# Patient Record
Sex: FEMALE | Race: BLACK OR AFRICAN AMERICAN | NOT HISPANIC OR LATINO | URBAN - METROPOLITAN AREA
[De-identification: names, ages, dates, MRNs, and addresses within clinical notes are randomized per-mention and may not be internally consistent; named-entity substitution may affect disease eponyms.]

---

## 2019-05-09 ENCOUNTER — EMERGENCY (EMERGENCY)
Facility: HOSPITAL | Age: 44
LOS: 1 days | Discharge: HOME | End: 2019-05-09
Attending: EMERGENCY MEDICINE | Admitting: EMERGENCY MEDICINE
Payer: MEDICAID

## 2019-05-09 VITALS
TEMPERATURE: 98 F | OXYGEN SATURATION: 98 % | SYSTOLIC BLOOD PRESSURE: 135 MMHG | RESPIRATION RATE: 18 BRPM | HEART RATE: 96 BPM | DIASTOLIC BLOOD PRESSURE: 93 MMHG

## 2019-05-09 PROCEDURE — 99284 EMERGENCY DEPT VISIT MOD MDM: CPT

## 2019-05-09 RX ORDER — HYDROXYZINE HCL 10 MG
25 TABLET ORAL ONCE
Refills: 0 | Status: COMPLETED | OUTPATIENT
Start: 2019-05-09 | End: 2019-05-09

## 2019-05-09 RX ORDER — CETIRIZINE HYDROCHLORIDE 10 MG/1
1 TABLET ORAL
Qty: 30 | Refills: 0
Start: 2019-05-09 | End: 2019-05-23

## 2019-05-09 RX ORDER — ALBUTEROL 90 UG/1
2 AEROSOL, METERED ORAL
Qty: 18 | Refills: 2
Start: 2019-05-09 | End: 2019-08-06

## 2019-05-09 RX ORDER — IPRATROPIUM/ALBUTEROL SULFATE 18-103MCG
3 AEROSOL WITH ADAPTER (GRAM) INHALATION
Refills: 0 | Status: COMPLETED | OUTPATIENT
Start: 2019-05-09 | End: 2019-05-09

## 2019-05-09 RX ADMIN — Medication 3 MILLILITER(S): at 17:56

## 2019-05-09 RX ADMIN — Medication 25 MILLIGRAM(S): at 17:17

## 2019-05-09 RX ADMIN — Medication 3 MILLILITER(S): at 17:55

## 2019-05-09 RX ADMIN — Medication 3 MILLILITER(S): at 17:17

## 2019-05-09 RX ADMIN — Medication 60 MILLIGRAM(S): at 17:17

## 2019-05-09 NOTE — ED ADULT TRIAGE NOTE - CHIEF COMPLAINT QUOTE
Pt c/o sob starting yesterday, hx of asthma given 2 duoneb treatments with minimal relief, pt breathing even and mildly labored at present, denies cp/discomfort, denies headache/dizziness.

## 2019-05-09 NOTE — ED ADULT NURSE NOTE - OBJECTIVE STATEMENT
Pt AxOx4 ambulatory at baseline presenting to intake with c/o SOB x1 day. PMH of Asthma. Pt states she is allergic to cats, was near cats last night and thinks it triggered her SOB. Pt took treatments at home but found no relief. On arrival to ED pt is slightly SOB, but satting well on RA. Pt denies CP, lightheadedness, dizziness, N/V. Pt to be medicated in intake with DuoNebs and Prednisone as per MD orders. MD at bedside, will continue to monitor.

## 2019-05-09 NOTE — ED PROVIDER NOTE - CARE PLAN
Principal Discharge DX:	Moderate persistent reactive airway disease with acute exacerbation  Secondary Diagnosis:	Dander (animal) allergy

## 2019-05-09 NOTE — ED PROVIDER NOTE - PMH
Anemia    Asthma  last attack was one year ago; Never intubated or hospitalized  Gestational diabetes    Umbilical hernia

## 2019-05-13 ENCOUNTER — EMERGENCY (EMERGENCY)
Facility: HOSPITAL | Age: 44
LOS: 1 days | Discharge: HOME | End: 2019-05-13
Attending: EMERGENCY MEDICINE | Admitting: EMERGENCY MEDICINE
Payer: MEDICAID

## 2019-05-13 VITALS
TEMPERATURE: 98 F | DIASTOLIC BLOOD PRESSURE: 77 MMHG | OXYGEN SATURATION: 95 % | SYSTOLIC BLOOD PRESSURE: 119 MMHG | HEART RATE: 93 BPM | RESPIRATION RATE: 19 BRPM

## 2019-05-13 VITALS
OXYGEN SATURATION: 100 % | DIASTOLIC BLOOD PRESSURE: 80 MMHG | RESPIRATION RATE: 18 BRPM | HEART RATE: 89 BPM | TEMPERATURE: 98 F | SYSTOLIC BLOOD PRESSURE: 138 MMHG

## 2019-05-13 PROCEDURE — 99284 EMERGENCY DEPT VISIT MOD MDM: CPT | Mod: 25

## 2019-05-13 RX ORDER — DIPHENHYDRAMINE HCL 50 MG
50 CAPSULE ORAL ONCE
Refills: 0 | Status: COMPLETED | OUTPATIENT
Start: 2019-05-13 | End: 2019-05-13

## 2019-05-13 RX ORDER — IPRATROPIUM/ALBUTEROL SULFATE 18-103MCG
3 AEROSOL WITH ADAPTER (GRAM) INHALATION ONCE
Refills: 0 | Status: COMPLETED | OUTPATIENT
Start: 2019-05-13 | End: 2019-05-13

## 2019-05-13 RX ADMIN — Medication 50 MILLIGRAM(S): at 03:20

## 2019-05-13 RX ADMIN — Medication 3 MILLILITER(S): at 02:50

## 2019-05-13 RX ADMIN — Medication 3 MILLILITER(S): at 02:35

## 2019-05-13 NOTE — ED PROVIDER NOTE - OBJECTIVE STATEMENT
43yoF hx of seasonal asthma pw mild sob after smoking a cigarette tonight, worse with the smoking, not improved with home albuterol, better on arrival to ED. no fevers, chills, n/v, dizziness, weakness, numbness, cough, chest pain, belly pain, paresthesias, fecal/urinary incontinence.

## 2019-05-13 NOTE — ED ADULT NURSE NOTE - OBJECTIVE STATEMENT
Pt received in rm 15, 43Y Female, AOx4, ambulatory, c/o sob. Pt reports was seen here yesterday for similar symptoms and dc on steroids. Pt reports had argument with daughter and had a cigarette and then felt sob. Pt breathing even and unlabored, O2 sat 100% on RA. Pt appears in NAD at this time, vitals as charted, pt given x1 duoneb as ordered, will continue to monitor.

## 2019-05-13 NOTE — ED ADULT TRIAGE NOTE - CHIEF COMPLAINT QUOTE
Patient c/o shortness of breath x1 hour and "burning to the chest". Patient reports taking prednisone and albuteral pump with no relief. Patient denies any hx. intubation. Hx. asthma.

## 2019-05-13 NOTE — ED PROVIDER NOTE - CLINICAL SUMMARY MEDICAL DECISION MAKING FREE TEXT BOX
female hx of asthma pw sob in setting of smoking cigarette, improved with duonebs. will monitor and dc home with continuation of steroids. female hx of asthma pw sob in setting of smoking cigarette, improved with Duoneb. likely asthma however will rule out pna, pnx with cxr and dc if clinically improved.

## 2019-05-13 NOTE — ED PROVIDER NOTE - PROGRESS NOTE DETAILS
rayna YOUNG - sob improved after duoneb. patient taking steroids currently. understands that smoking can aggravate asthma. will dc home and counseled patient on quitting smoking, and continuing steroids. patient agrees and understands. will dc home. Geoffrey YOUNG - patient refused xray. understands we could miss a popped lung or pna. will dc home.

## 2019-05-13 NOTE — ED PROVIDER NOTE - ATTENDING CONTRIBUTION TO CARE
Pt with recent asthma exacerbation seen here a few days ago with nebs and steroids.  Was doing better until tonight when she smoked a cigarette and started to have trouble breathing again.  Never intubated and no ICU stays    Gen: Well appearing in NAD  Head: NC/AT  Neck: trachea midline  Resp:  No distress CTAB  Ext: no deformities  Neuro:  A&O appears non focal  Skin:  Warm and dry as visualized  Psych:  Normal affect and mood     Pt with SOB and wheezing after cigarette use in the setting of a known exacerbation.  Will treat with nebs as well as add an xray to ensure not missing any other pathology.  Will be able to be dc with continued steroid course as pt appears and sounds clear in ED Pt with recent asthma exacerbation seen here a few days ago with nebs and steroids.  Was doing better until tonight when she smoked a cigarette and started to have trouble breathing again.  Never intubated and no ICU stays    Gen: Well appearing in NAD  Head: NC/AT  Neck: trachea midline  Resp:  No distress CTAB  Ext: no deformities  Neuro:  A&O appears non focal  Skin:  Warm and dry as visualized  Psych:  Normal affect and mood     Pt with SOB and wheezing after cigarette use in the setting of a known exacerbation.  Will treat with nebs as well as add an xray to ensure not missing any other pathology.  Will be able to be dc with continued steroid course as pt appears and sounds clear in ED.

## 2019-05-13 NOTE — ED PROVIDER NOTE - NSFOLLOWUPINSTRUCTIONS_ED_ALL_ED_FT
1) Follow up with your doctor in 1 week. Call for an appointment.   2) Return to the ER immediately for new or worsening symptoms including uncontrollable chest pain, vomiting, sob or other issues.   3) Take albuterol inhaler every 4-6 hours as needed for shortness of breath.  4) continue to take the steroids as prescribed.

## 2019-05-13 NOTE — ED PROVIDER NOTE - CARE PLAN
Principal Discharge DX:	Asthma exacerbation, mild Principal Discharge DX:	Asthma exacerbation, mild  Secondary Diagnosis:	Shortness of breath

## 2019-06-21 ENCOUNTER — EMERGENCY (EMERGENCY)
Facility: HOSPITAL | Age: 44
LOS: 1 days | Discharge: HOME | End: 2019-06-21
Attending: EMERGENCY MEDICINE | Admitting: EMERGENCY MEDICINE
Payer: MEDICAID

## 2019-06-21 VITALS
HEART RATE: 82 BPM | RESPIRATION RATE: 14 BRPM | DIASTOLIC BLOOD PRESSURE: 75 MMHG | TEMPERATURE: 98 F | SYSTOLIC BLOOD PRESSURE: 111 MMHG | OXYGEN SATURATION: 96 %

## 2019-06-21 VITALS
DIASTOLIC BLOOD PRESSURE: 76 MMHG | RESPIRATION RATE: 16 BRPM | HEART RATE: 87 BPM | OXYGEN SATURATION: 95 % | TEMPERATURE: 99 F | SYSTOLIC BLOOD PRESSURE: 118 MMHG

## 2019-06-21 PROCEDURE — 93010 ELECTROCARDIOGRAM REPORT: CPT

## 2019-06-21 PROCEDURE — 99284 EMERGENCY DEPT VISIT MOD MDM: CPT | Mod: 25

## 2019-06-21 RX ORDER — IPRATROPIUM/ALBUTEROL SULFATE 18-103MCG
3 AEROSOL WITH ADAPTER (GRAM) INHALATION ONCE
Refills: 0 | Status: COMPLETED | OUTPATIENT
Start: 2019-06-21 | End: 2019-06-21

## 2019-06-21 RX ORDER — DEXAMETHASONE 0.5 MG/5ML
10 ELIXIR ORAL ONCE
Refills: 0 | Status: DISCONTINUED | OUTPATIENT
Start: 2019-06-21 | End: 2019-06-21

## 2019-06-21 RX ADMIN — Medication 3 MILLILITER(S): at 07:29

## 2019-06-21 RX ADMIN — Medication 60 MILLIGRAM(S): at 07:30

## 2019-06-21 RX ADMIN — Medication 3 MILLILITER(S): at 07:30

## 2019-06-21 NOTE — ED PROVIDER NOTE - OBJECTIVE STATEMENT
42yo woman PMH asthma presents with SOB x 2 days. Pt reports that she felt tightness when breathing yesterday and used her albuterol pump twice yesterday with some improvement but continued to have SOB this morning and tried her albuterol pump as well as nebulizer with minimal improvement and called EMS. EMS gave her 1 nebulizer treatment which improved her symptoms. She also notices that she's been having more cough in the past few days with clear sputum. No runny nose or congestion, no ear pain, no eye redness, no fever. She denies chest pain, n/v, lightheadedness, no LE swelling or pain, no recent travels. 42yo woman PMH asthma presents with SOB x 2 days. Pt reports that she felt tightness when breathing yesterday and used her albuterol pump twice yesterday with some improvement but continued to have SOB this morning and tried her albuterol pump as well as nebulizer with minimal improvement and called EMS. EMS gave her 1 nebulizer treatment which improved her symptoms. She also notices that she's been having more cough in the past few days with clear sputum. No runny nose or congestion, no ear pain, no eye redness, no fever. She denies chest pain, n/v, lightheadedness, no LE swelling or pain, no recent travels, no recent hospitalization or surgeries, no history of cancer, no birth control medications, no history of clots. She has no history of hospitalizations or intubations due to asthma

## 2019-06-21 NOTE — ED PROVIDER NOTE - NS ED ROS FT
General: no fever  Head: no headache or lightheadedness  Eyes: no vision changes or blurry vision, no eye redness  ENT: no ear pain, no nasal discharge, no neck pain  CV: no chest pain, no palpitations  Resp: +SOB, +cough  GI: no N/V, no abdominal pain  : no dysuria  MSK: no joint pain  Skin: no new rash  Neuro: no focal weakness, no change in sensation

## 2019-06-21 NOTE — ED PROVIDER NOTE - NSFOLLOWUPINSTRUCTIONS_ED_ALL_ED_FT
Please follow up with your primary care provider within the next 1-2 days.    Take prednisone as prescribed for 4 additional days. Use albuterol 2 puffs every 4 hours for 48 hours. Afterward, use albuterol 2 puffs every 4 hours as needed for wheezing or shortness of breath. Use a spacer as directed.    Get help right away if:  Your peak flow is less than 50% of your personal best (red zone).  You are getting worse and do not respond to treatment during an asthma flare.  You are short of breath at rest or when doing very little physical activity.  You have difficulty eating, drinking, or talking.  You have chest pain.  Your lips or fingernails look bluish.  You are light-headed or dizzy, or you faint.    Please read all attached.

## 2019-06-21 NOTE — ED PROVIDER NOTE - PROGRESS NOTE DETAILS
Sola Unger, resident MD: pt reports improvement of symptoms. clear lungs on exam, will give instructions for prednisone x 4 days and albuterol use with spacer. will discharge patient home at this time. discussed return precautions and need for outpatient follow up.

## 2019-06-21 NOTE — ED ADULT NURSE NOTE - NSIMPLEMENTINTERV_GEN_ALL_ED
Implemented All Universal Safety Interventions:  [place] to call system. Call bell, personal items and telephone within reach. Instruct patient to call for assistance. Room bathroom lighting operational. Non-slip footwear when patient is off stretcher. Physically safe environment: no spills, clutter or unnecessary equipment. Stretcher in lowest position, wheels locked, appropriate side rails in place.

## 2019-06-21 NOTE — ED ADULT NURSE NOTE - OBJECTIVE STATEMENT
Pt is a 43 year old female reporting to the ED for asthma exacerbation. Pt reports waking up at 4 am from sleep, coughing and SOB. Pt reports she did an albuterol inhaler at home with no relief. Pt called 911, received 1 tx of combivent and reports "feeling better". Pt reports decrease sob and decreased cough. Pt is AOx4. Pt denies chest pain. Pt reports SOB, mild. Pt rr even and unlabored. Pt speaking in full sentences, no drooling or stridor noted. Pt denies fever, chills, n/v/. Pt not coughing at this time. Pt received medication as ordered, will continue to monitor.

## 2019-06-21 NOTE — ED ADULT TRIAGE NOTE - CHIEF COMPLAINT QUOTE
Pt ambulatory to triage via [facility] pt st" I have asthma coughing since yesterday morning using inhaler intially help but now this morning not helping feel worse." Pt talking in complete sentences As per EMT" Gave combivent tx x1"

## 2019-06-21 NOTE — ED PROVIDER NOTE - PHYSICAL EXAMINATION
General: well-appearing woman in no acute respiratory distress, speaking in full sentences  Head: normocephalic, atraumatic  Eyes: clear eyes with no redness or discharge  Mouth: moist mucous membranes  Neck: supple neck  CV: normal rate and rhythm, normal S1 and S2  Respiratory: expiratory wheezing at bases  Abdomen: soft, nontender, nondistended  Neuro: alert and oriented x3, speech clear, moving all extremities spontaneously  Extremities: no LE edema or tenderness to palpation, peripheral pulses 2+ bilaterally

## 2019-06-21 NOTE — ED PROVIDER NOTE - ATTENDING CONTRIBUTION TO CARE
Dr. Burleson: 44 yo female with asthma (never hospitalized or intubated for asthma) in ED with 2 days of SOB and cough that feels similar to prior asthma exacerbations.  Pt used albuterol and nebulizer at home with minimal improvement and so called EMS.  EMS gave pt nebulizer treatment and pt felt improvement before arriving in ED.  She denies CP, fever, N/V/D or abdominal pain.  I evaluated pt after she had already received nebulizer treatment and reported improvement in symptoms.  On exam pt well appearing, in NAD, heart RRR, lungs CTAB, abd NTND, extremities without swelling, strength 5/5 in all extremities and skin without rash.

## 2019-06-21 NOTE — ED PROVIDER NOTE - CLINICAL SUMMARY MEDICAL DECISION MAKING FREE TEXT BOX
42yo woman PMH asthma presents with SOB x 2 days with cough and expiratory wheezing bilaterally on exam. Will check ecg to r/o cardiac etiology, but pt has no chest pain. Likely asthma exacerbation with improvement with duonebs with ems, will give duonebs and start on steroids. discussed xray with pt to r/o pna vs pneumo and r/b discussed but pt refuses xray at this time. bilateral breath sounds and no fevers so low suspicion. Will reassess after treatment and continue to monitor. 42yo woman PMH asthma presents with SOB x 2 days with cough and expiratory wheezing bilaterally on exam. Will check ecg to r/o cardiac etiology, but pt has no chest pain. Likely asthma exacerbation with improvement with duonebs with ems, will give duonebs and start on steroids. discussed xray with pt to r/o pna vs pneumo and r/b discussed but pt refuses xray at this time. bilateral breath sounds and no fevers so low suspicion. pt is perc negative. Will reassess after treatment and continue to monitor.

## 2019-10-20 ENCOUNTER — EMERGENCY (EMERGENCY)
Facility: HOSPITAL | Age: 44
LOS: 1 days | Discharge: HOME | End: 2019-10-20
Attending: HOSPITALIST | Admitting: HOSPITALIST
Payer: MEDICAID

## 2019-10-20 VITALS
DIASTOLIC BLOOD PRESSURE: 81 MMHG | HEART RATE: 63 BPM | OXYGEN SATURATION: 99 % | SYSTOLIC BLOOD PRESSURE: 113 MMHG | RESPIRATION RATE: 18 BRPM | TEMPERATURE: 98 F

## 2019-10-20 VITALS
TEMPERATURE: 98 F | DIASTOLIC BLOOD PRESSURE: 102 MMHG | RESPIRATION RATE: 20 BRPM | SYSTOLIC BLOOD PRESSURE: 130 MMHG | OXYGEN SATURATION: 99 % | HEART RATE: 90 BPM

## 2019-10-20 LAB
ALBUMIN SERPL ELPH-MCNC: 4 G/DL — SIGNIFICANT CHANGE UP (ref 3.3–5)
ALP SERPL-CCNC: 52 U/L — SIGNIFICANT CHANGE UP (ref 40–120)
ALT FLD-CCNC: 10 U/L — SIGNIFICANT CHANGE UP (ref 4–33)
ANION GAP SERPL CALC-SCNC: 11 MMO/L — SIGNIFICANT CHANGE UP (ref 7–14)
AST SERPL-CCNC: 11 U/L — SIGNIFICANT CHANGE UP (ref 4–32)
BASOPHILS # BLD AUTO: 0.03 K/UL — SIGNIFICANT CHANGE UP (ref 0–0.2)
BASOPHILS NFR BLD AUTO: 0.3 % — SIGNIFICANT CHANGE UP (ref 0–2)
BILIRUB SERPL-MCNC: < 0.2 MG/DL — LOW (ref 0.2–1.2)
BUN SERPL-MCNC: 11 MG/DL — SIGNIFICANT CHANGE UP (ref 7–23)
CALCIUM SERPL-MCNC: 9.1 MG/DL — SIGNIFICANT CHANGE UP (ref 8.4–10.5)
CHLORIDE SERPL-SCNC: 105 MMOL/L — SIGNIFICANT CHANGE UP (ref 98–107)
CO2 SERPL-SCNC: 22 MMOL/L — SIGNIFICANT CHANGE UP (ref 22–31)
CREAT SERPL-MCNC: 0.66 MG/DL — SIGNIFICANT CHANGE UP (ref 0.5–1.3)
EOSINOPHIL # BLD AUTO: 0.38 K/UL — SIGNIFICANT CHANGE UP (ref 0–0.5)
EOSINOPHIL NFR BLD AUTO: 4.4 % — SIGNIFICANT CHANGE UP (ref 0–6)
GLUCOSE SERPL-MCNC: 80 MG/DL — SIGNIFICANT CHANGE UP (ref 70–99)
HCT VFR BLD CALC: 35.3 % — SIGNIFICANT CHANGE UP (ref 34.5–45)
HGB BLD-MCNC: 11.1 G/DL — LOW (ref 11.5–15.5)
HIV COMBO RESULT: SIGNIFICANT CHANGE UP
HIV1+2 AB SPEC QL: SIGNIFICANT CHANGE UP
IMM GRANULOCYTES NFR BLD AUTO: 0.2 % — SIGNIFICANT CHANGE UP (ref 0–1.5)
LYMPHOCYTES # BLD AUTO: 3.2 K/UL — SIGNIFICANT CHANGE UP (ref 1–3.3)
LYMPHOCYTES # BLD AUTO: 36.7 % — SIGNIFICANT CHANGE UP (ref 13–44)
MCHC RBC-ENTMCNC: 29.4 PG — SIGNIFICANT CHANGE UP (ref 27–34)
MCHC RBC-ENTMCNC: 31.4 % — LOW (ref 32–36)
MCV RBC AUTO: 93.6 FL — SIGNIFICANT CHANGE UP (ref 80–100)
MONOCYTES # BLD AUTO: 0.93 K/UL — HIGH (ref 0–0.9)
MONOCYTES NFR BLD AUTO: 10.7 % — SIGNIFICANT CHANGE UP (ref 2–14)
NEUTROPHILS # BLD AUTO: 4.16 K/UL — SIGNIFICANT CHANGE UP (ref 1.8–7.4)
NEUTROPHILS NFR BLD AUTO: 47.7 % — SIGNIFICANT CHANGE UP (ref 43–77)
NRBC # FLD: 0 K/UL — SIGNIFICANT CHANGE UP (ref 0–0)
PLATELET # BLD AUTO: 332 K/UL — SIGNIFICANT CHANGE UP (ref 150–400)
PMV BLD: 9.9 FL — SIGNIFICANT CHANGE UP (ref 7–13)
POTASSIUM SERPL-MCNC: 3.9 MMOL/L — SIGNIFICANT CHANGE UP (ref 3.5–5.3)
POTASSIUM SERPL-SCNC: 3.9 MMOL/L — SIGNIFICANT CHANGE UP (ref 3.5–5.3)
PROT SERPL-MCNC: 7 G/DL — SIGNIFICANT CHANGE UP (ref 6–8.3)
RBC # BLD: 3.77 M/UL — LOW (ref 3.8–5.2)
RBC # FLD: 13 % — SIGNIFICANT CHANGE UP (ref 10.3–14.5)
SODIUM SERPL-SCNC: 138 MMOL/L — SIGNIFICANT CHANGE UP (ref 135–145)
WBC # BLD: 8.72 K/UL — SIGNIFICANT CHANGE UP (ref 3.8–10.5)
WBC # FLD AUTO: 8.72 K/UL — SIGNIFICANT CHANGE UP (ref 3.8–10.5)

## 2019-10-20 PROCEDURE — 99284 EMERGENCY DEPT VISIT MOD MDM: CPT

## 2019-10-20 PROCEDURE — 71046 X-RAY EXAM CHEST 2 VIEWS: CPT | Mod: 26

## 2019-10-20 RX ORDER — IPRATROPIUM/ALBUTEROL SULFATE 18-103MCG
3 AEROSOL WITH ADAPTER (GRAM) INHALATION ONCE
Refills: 0 | Status: COMPLETED | OUTPATIENT
Start: 2019-10-20 | End: 2019-10-20

## 2019-10-20 RX ADMIN — Medication 3 MILLILITER(S): at 01:43

## 2019-10-20 RX ADMIN — Medication 3 MILLILITER(S): at 01:41

## 2019-10-20 NOTE — ED ADULT TRIAGE NOTE - CHIEF COMPLAINT QUOTE
Pt walk in. c/o Shortness of Breath worsening today, taking Symbicort/Montelukast. PMHx Asthma. Never Intubated. Dx with Pneumonia 2 weeks ago. Finished ABx. claimed do not feels better. Also c/o Breaking out of Pimples.   Denies Dizziness Ha CP palpitation N V cough Fever No sick contact

## 2019-10-20 NOTE — ED PROVIDER NOTE - PATIENT PORTAL LINK FT
You can access the FollowMyHealth Patient Portal offered by [facility] by registering at the following website: [email or web address]. By joining [employer]’s FollowMyHealth portal, you will also be able to view your health information using other applications (apps) compatible with our system.

## 2019-10-20 NOTE — ED ADULT NURSE NOTE - NSIMPLEMENTINTERV_GEN_ALL_ED
Implemented All Universal Safety Interventions:  [place] to call system. Call bell, personal items and telephone within reach. Instruct patient to call for assistance. Room bathroom lighting operational. Non-slip footwear when patient is off stretcher. Physically safe environment: no spills, clutter or unnecessary equipment. Stretcher in lowest position, wheels locked, appropriate side rails in place.

## 2019-10-20 NOTE — ED PROVIDER NOTE - ATTENDING CONTRIBUTION TO CARE
44F with hx of asthma and tobacco use p/w sob. patient states 2 weeks ago she was hospitalized for COPD exacerbation and pneumonia. feeling better after discharge from hospital but today developed recurrent sob. no cough or fever, has been using prescribed inhalers w/o improvement.   ***GEN - NAD; well appearing; A+O x3 ***HEAD - NC/AT ***EYES/NOSE - PEERL, EOMI, mucous membranes moist, no discharge ***THROAT: Oral cavity and pharynx normal. No inflammation, swelling, exudate, or lesions.  ***NECK: Neck supple, non-tender without lymphadenopathy, no masses, no thyromegaly.   ***PULMONARY - CTA b/l, symmetric breath sounds. ***CARDIAC -s1s2, RRR, no M,G,R  ***ABDOMEN - +BS, ND, NT, soft, no guarding, no rebound, no masses   ***BACK - no CVA tenderness, Normal  spine ***EXTREMITIES - symmetric pulses, 2+ dp, capillary refill < 2 seconds, no clubbing, no cyanosis, no edema ***SKIN - no rash or bruising   ***NEUROLOGIC - alert, CN 2-12 intact, reflexes nl, sensation nl, coordination nl, finger to nose nl, romberg negative, motor 5/5 RUE/LUE/RLE/LLE/EHL/Plantar flexion, no pronator drift, gait nl, ***PSYCH - insight and judgment nl, memory nl, affect nl, thought nl  MDM: 44F with asthma, recent PNA p/w recurrent sob x1 day. no fever or wheezing on exam. well appearing. labs, cxr, ekg, nebs, reassess.

## 2019-10-20 NOTE — ED PROVIDER NOTE - OBJECTIVE STATEMENT
44f w hx asthma here for multiple complaints. Pt states was admitted to a hospital in [place] for SOB when was dx w pna and discharged 2 wks ago w total 5day course abx. From there was started on symbicort and singulair; was never previously on daily asthma meds and never admitted/intubated. C/o SOB that has been present since but worsened today. Denies cp or cough. Does not feel wheeze. Also c/o comedonal rash to face that is chronic. No recent travel, no leg pain/swelling, no hx VTE, not on hormonal tx.

## 2019-10-20 NOTE — ED PROVIDER NOTE - CLINICAL SUMMARY MEDICAL DECISION MAKING FREE TEXT BOX
44f w asthma here for acute on chronic SOB. Appears well w normal VS. Lungs clear. Will treat sx w nebs though unlikely asthma exac given exam. Also check basic labs and cxr given recent pna, reassess

## 2019-10-20 NOTE — ED PROVIDER NOTE - NSFOLLOWUPCLINICS_GEN_ALL_ED_FT
[facility] General Internal Medicine  General Internal Medicine  [address] [place] [address]  Phone: [phone]  Fax:     [facility] Pulmonolgy and Sleep Medicine  Pulmonology  [address] [place] [address]  Phone: [phone]  Fax:   Follow Up Time:

## 2019-10-20 NOTE — ED ADULT NURSE NOTE - OBJECTIVE STATEMENT
45y/o female aaox4 and ambulatory c/o SOB. Pt states she has been SOB x7 months and it has been worsening. Pt states a recent admission to the hospital d/t SOB and "I almost ." Pt states she has been experiencing intermittent chest pains with inspiration. Pt denies cough, back pain, palpitations, diaphoresis, N/V/D. Vital signs as noted. 20g in RAC; labs collected and sent as per MD order. Pt medicated as per MD order. Will continue to monitor.

## 2019-10-20 NOTE — ED PROVIDER NOTE - NSFOLLOWUPINSTRUCTIONS_ED_ALL_ED_FT
You were seen in the emergency department for shortness of breath. Please follow up with your primary doctor and a pulmonologist. Continue taking your medications as prescribed. Return to the emergency department immediately if you experience difficulty breathing, fever, chest pain or any other concerning symptoms.

## 2019-10-20 NOTE — ED PROVIDER NOTE - PROGRESS NOTE DETAILS
Elizabeth Goldberger PGY-3: CXR neg. Pt feeling better. Lung sounds clear. Pt already w symbicort at home, will dc w outpt fu

## 2019-11-23 ENCOUNTER — HOSPITAL ENCOUNTER (EMERGENCY)
Facility: HOSPITAL | Age: 44
Discharge: HOME | End: 2019-11-23
Attending: EMERGENCY MEDICINE | Admitting: EMERGENCY MEDICINE
Payer: MEDICAID

## 2019-11-23 VITALS
WEIGHT: 180 LBS | OXYGEN SATURATION: 99 % | RESPIRATION RATE: 18 BRPM | SYSTOLIC BLOOD PRESSURE: 119 MMHG | TEMPERATURE: 98.9 F | HEIGHT: 62 IN | BODY MASS INDEX: 33.13 KG/M2 | HEART RATE: 72 BPM | DIASTOLIC BLOOD PRESSURE: 55 MMHG

## 2019-11-23 DIAGNOSIS — B37.3 VULVOVAGINAL CANDIDIASIS: Primary | ICD-10-CM

## 2019-11-23 LAB
BACTERIA UR QL AUTO: ABNORMAL /HPF
BILIRUB UR QL STRIP: NEGATIVE
CLARITY UR: ABNORMAL
COLOR UR: YELLOW
COLOR, POC: ABNORMAL
EXT PREG TEST URINE: NEGATIVE
EXT. CONTROL ED NAV: NORMAL
GLUCOSE UR STRIP-MCNC: NEGATIVE MG/DL
HGB UR QL STRIP.AUTO: ABNORMAL
KETONES UR STRIP-MCNC: NEGATIVE MG/DL
LEUKOCYTE ESTERASE UR QL STRIP: ABNORMAL
NITRITE UR QL STRIP: NEGATIVE
NON-SQ EPI CELLS URNS QL MICRO: ABNORMAL /HPF
PH UR STRIP.AUTO: 5 [PH] (ref 4.5–8)
PROT UR STRIP-MCNC: ABNORMAL MG/DL
RBC #/AREA URNS AUTO: ABNORMAL /HPF
SP GR UR STRIP.AUTO: >=1.03 (ref 1–1.03)
UROBILINOGEN UR QL STRIP.AUTO: 0.2 E.U./DL
WBC #/AREA URNS AUTO: ABNORMAL /HPF

## 2019-11-23 PROCEDURE — 99283 EMERGENCY DEPT VISIT LOW MDM: CPT

## 2019-11-23 PROCEDURE — 81001 URINALYSIS AUTO W/SCOPE: CPT

## 2019-11-23 PROCEDURE — 81025 URINE PREGNANCY TEST: CPT | Performed by: EMERGENCY MEDICINE

## 2019-11-23 PROCEDURE — 99284 EMERGENCY DEPT VISIT MOD MDM: CPT | Performed by: EMERGENCY MEDICINE

## 2019-11-23 RX ORDER — FLUCONAZOLE 200 MG/1
200 TABLET ORAL ONCE
Status: COMPLETED | OUTPATIENT
Start: 2019-11-23 | End: 2019-11-23

## 2019-11-23 RX ORDER — FLUCONAZOLE 200 MG/1
200 TABLET ORAL ONCE
Qty: 1 TABLET | Refills: 0 | Status: SHIPPED | OUTPATIENT
Start: 2019-11-23 | End: 2019-11-23

## 2019-11-23 RX ORDER — CLOTRIMAZOLE 1 %
1 CREAM WITH APPLICATOR VAGINAL
Qty: 45 G | Refills: 0 | Status: SHIPPED | OUTPATIENT
Start: 2019-11-23 | End: 2019-11-30

## 2019-11-23 RX ADMIN — FLUCONAZOLE 200 MG: 200 TABLET ORAL at 09:51

## 2019-11-23 NOTE — ED ATTENDING ATTESTATION
11/23/2019  IMarc MD, saw and evaluated the patient  I have discussed the patient with the resident/non-physician practitioner and agree with the resident's/non-physician practitioner's findings, Plan of Care, and MDM as documented in the resident's/non-physician practitioner's note, except where noted  All available labs and Radiology studies were reviewed  I was present for key portions of any procedure(s) performed by the resident/non-physician practitioner and I was immediately available to provide assistance  At this point I agree with the current assessment done in the Emergency Department  I have conducted an independent evaluation of this patient a history and physical is as follows:    Vaginal burning and itching  White discharge  Similar to past yeast infections  Sexually active with 1 partner  No urainry symptoms  No other discharge  VS and nursing notes reviewed  General: Appears in NAD, awake, alert, speaking normally in full sentences  Well-nourished, well-developed  Appears stated age  Head: Normocephalic, atraumatic  Eyes: EOMI  Vision grossly normal  No subconjunctival hemorrhages or occular discharge noted  Symmetrical lids  ENT: Atraumatic external nose and ears  No stridor  Normal phonation  No drooling  Normal swallowing  Neck: No JVD  FROM  No goiter  CV: No pallor  Normal rate  Lungs: No tachypnea  No respiratory distress  MSK: Moving all extremities equally, no peripheral edema  Skin: Dry, intact  No cyanosis  Neuro: Awake, alert, GCS15  CN II-XII grossly intact  Grossly normal gait  Psychiatric/Behavioral: Appropriate mood and affect  A/P: This is a 40 y o  female who presents to the ED for evaluation of vaginal symptoms  Will treat with diflucan po x2  First dose here  Topical PRN       ED Course       Critical Care Time  Procedures

## 2019-11-23 NOTE — ED PROVIDER NOTES
History  Chief Complaint   Patient presents with    Vaginal Itching     Pt reports she has a yeast infection for the past 3 days and has tried over the counter products with no relief  Patient reports itching, white discharge, and vaginal pain  Reports burning with urination but only because "Mariana been scratching"        Vaginal Itching   Severity:  Moderate  Onset quality:  Gradual  Duration:  3 days  Timing:  Constant  Progression:  Worsening  Chronicity:  New  Associated symptoms: no abdominal pain, no chest pain, no cough, no diarrhea, no fever, no nausea, no rash, no rhinorrhea, no shortness of breath, no sore throat and no vomiting        None       Past Medical History:   Diagnosis Date    Asthma        Past Surgical History:   Procedure Laterality Date    JOINT REPLACEMENT         History reviewed  No pertinent family history  I have reviewed and agree with the history as documented  Social History     Tobacco Use    Smoking status: Never Smoker    Smokeless tobacco: Never Used   Substance Use Topics    Alcohol use: Not Currently    Drug use: Not Currently        Review of Systems   Constitutional: Negative for chills and fever  HENT: Negative for rhinorrhea and sore throat  Eyes: Negative for photophobia and visual disturbance  Respiratory: Negative for cough and shortness of breath  Cardiovascular: Negative for chest pain and palpitations  Gastrointestinal: Negative for abdominal pain, blood in stool, diarrhea, nausea and vomiting  Genitourinary: Positive for vaginal discharge  Negative for dysuria, hematuria and vaginal bleeding  Musculoskeletal: Negative for neck pain and neck stiffness  Skin: Negative for rash and wound  Neurological: Negative for weakness and numbness  Psychiatric/Behavioral: Negative for agitation and confusion  All other systems reviewed and are negative        Physical Exam  ED Triage Vitals   Temperature Pulse Respirations Blood Pressure SpO2 11/23/19 0905 11/23/19 0912 11/23/19 0912 11/23/19 0912 11/23/19 0912   98 9 °F (37 2 °C) 72 18 119/55 99 %      Temp Source Heart Rate Source Patient Position - Orthostatic VS BP Location FiO2 (%)   11/23/19 0905 11/23/19 0912 11/23/19 0912 11/23/19 0912 --   Oral Monitor Lying Right arm       Pain Score       11/23/19 0912       9             Orthostatic Vital Signs  Vitals:    11/23/19 0912   BP: 119/55   Pulse: 72   Patient Position - Orthostatic VS: Lying       Physical Exam   Constitutional: She appears well-developed  No distress  HENT:   Head: Normocephalic  Right Ear: External ear normal    Left Ear: External ear normal    Nose: Nose normal    Mouth/Throat: Oropharynx is clear and moist    Eyes: Conjunctivae and EOM are normal    Neck: No tracheal deviation present  Cardiovascular: Normal rate, normal heart sounds and intact distal pulses  Pulmonary/Chest: Effort normal and breath sounds normal  No stridor  No respiratory distress  She has no wheezes  She has no rales  She exhibits no tenderness  Abdominal: Soft  She exhibits no distension  There is no tenderness  There is no rebound and no guarding  Musculoskeletal: She exhibits no tenderness or deformity  Neurological: She is alert  No cranial nerve deficit or sensory deficit  She exhibits normal muscle tone  Skin: Skin is warm and dry  Capillary refill takes less than 2 seconds  She is not diaphoretic  Psychiatric: Her behavior is normal    Nursing note and vitals reviewed  ED Medications  Medications   fluconazole (DIFLUCAN) tablet 200 mg (200 mg Oral Given 11/23/19 0951)       Diagnostic Studies  Results Reviewed     Procedure Component Value Units Date/Time    Urine Microscopic [[id number]] Collected:  11/23/19 0910    Lab Status:   In process Specimen:  Urine, Clean Catch Updated:  11/23/19 0914    POCT urinalysis dipstick [[id number]]  (Abnormal) Resulted:  11/23/19 0911    Lab Status:  Final result Updated:  11/23/19 532 Color, UA see chart    POCT pregnancy, urine [[id number]]  (Normal) Resulted:  11/23/19 0911    Lab Status:  Final result Updated:  11/23/19 0911     EXT PREG TEST UR (Ref: Negative) negative     Control valid    Urine Macroscopic, POC [[id number]]  (Abnormal) Collected:  11/23/19 0910    Lab Status:  Final result Specimen:  Urine Updated:  11/23/19 0910     Color, UA Yellow     Clarity, UA Cloudy     pH, UA 5 0     Leukocytes, UA Small     Nitrite, UA Negative     Protein, UA Trace mg/dl      Glucose, UA Negative mg/dl      Ketones, UA Negative mg/dl      Urobilinogen, UA 0 2 E U /dl      Bilirubin, UA Negative     Blood, UA Moderate     Specific Gravity, UA >=1 030    Narrative:       CLINITEK RESULT                 No orders to display         Procedures  Procedures        ED Course  ED Course as of Nov 23 0953   Sat Nov 23, 2019   0913 Nitrite, UA: Negative   0913 PREGNANCY TEST URINE: negative                               MDM  Number of Diagnoses or Management Options  Vulvovaginal candidiasis:   Diagnosis management comments: 41 yo female presents with 3 days of vaginal burning, itching, and white discharge concerning for yeast infection  Pt reports symptoms feel exactly like past yeast infection  Denies lower abdominal pain, fever/chills, and pt has normal vitals all making PID unlikely  Pt reports sexually active with 1 monogamous partner  No other risk factors to suggest STI  First nursed orders for UA although I do not believe her symptoms are from a UTI  U preg negative  Will give dose of oral fluconazole in ED  Pt reports has required longer course in past for yeast infection so will prescribe repeat dose ofPO fluconazole, instructed her to take in 3 days if symptoms not resolving by then  Will also rx for topical  Counseled pt to f/u with gyn        Disposition  Final diagnoses:   Vulvovaginal candidiasis     Time reflects when diagnosis was documented in both MDM as applicable and the Disposition within this note     Time User Action Codes Description Comment    11/23/2019  9:20 AM Ana Pedraza Course Add [B37 3] Vulvovaginal candidiasis       ED Disposition     ED Disposition Condition Date/Time Comment    Discharge Stable Sat Nov 23, 2019  9:19 AM Lucio Syed discharge to home/self care  Follow-up Information     Follow up With Specialties Details Why Contact Info Additional Information    [employer]   Call to follow up with a primary care provider, As needed [address] [facility] Schedule an appointment as soon as possible for a visit   [address] [facility] [address] [phone] BV JM LYONS GJDLF, [address] [place] [address], [id number] [phone]          Patient's Medications   Discharge Prescriptions    CLOTRIMAZOLE (GYNE-LOTRIMIN) 1 % VAGINAL CREAM    Insert 1 applicator into the vagina daily at bedtime for 7 days       Start Date: 11/23/2019End Date: 11/30/2019       Order Dose: 1 applicator       Quantity: 45 g    Refills: 0    FLUCONAZOLE (DIFLUCAN) 200 MG TABLET    Take 1 tablet (200 mg total) by mouth once for 1 dose       Start Date: 11/23/2019End Date: 11/23/2019       Order Dose: 200 mg       Quantity: 1 tablet    Refills: 0     No discharge procedures on file  ED Provider  Attending physically available and evaluated [facility]  I managed the patient along with the ED Attending      Electronically Signed by         Melchor Santiago MD  11/23/19 7963

## 2020-02-10 NOTE — ED ADULT TRIAGE NOTE - PAIN RATING/NUMBER SCALE (0-10): ACTIVITY
Pt was seen earlier today in pre op for inguinal hernia repair, pt began having Right upper abd pain denies nausea and was sent to ed    0

## 2020-02-17 ENCOUNTER — EMERGENCY (EMERGENCY)
Facility: HOSPITAL | Age: 45
LOS: 0 days | Discharge: HOME | End: 2020-02-18
Attending: EMERGENCY MEDICINE | Admitting: EMERGENCY MEDICINE
Payer: COMMERCIAL

## 2020-02-17 VITALS
TEMPERATURE: 98 F | OXYGEN SATURATION: 98 % | DIASTOLIC BLOOD PRESSURE: 58 MMHG | HEIGHT: 63 IN | HEART RATE: 97 BPM | RESPIRATION RATE: 20 BRPM | WEIGHT: 195.11 LBS | SYSTOLIC BLOOD PRESSURE: 129 MMHG

## 2020-02-17 DIAGNOSIS — J34.89 OTHER SPECIFIED DISORDERS OF NOSE AND NASAL SINUSES: ICD-10-CM

## 2020-02-17 DIAGNOSIS — R05 COUGH: ICD-10-CM

## 2020-02-17 DIAGNOSIS — J06.9 ACUTE UPPER RESPIRATORY INFECTION, UNSPECIFIED: ICD-10-CM

## 2020-02-17 DIAGNOSIS — Z87.891 PERSONAL HISTORY OF NICOTINE DEPENDENCE: ICD-10-CM

## 2020-02-17 DIAGNOSIS — J45.901 UNSPECIFIED ASTHMA WITH (ACUTE) EXACERBATION: ICD-10-CM

## 2020-02-17 PROCEDURE — 99285 EMERGENCY DEPT VISIT HI MDM: CPT

## 2020-02-17 PROCEDURE — 93010 ELECTROCARDIOGRAM REPORT: CPT

## 2020-02-17 RX ORDER — IPRATROPIUM/ALBUTEROL SULFATE 18-103MCG
3 AEROSOL WITH ADAPTER (GRAM) INHALATION
Refills: 0 | Status: DISCONTINUED | OUTPATIENT
Start: 2020-02-17 | End: 2020-02-18

## 2020-02-17 RX ORDER — DEXAMETHASONE 0.5 MG/5ML
12 ELIXIR ORAL ONCE
Refills: 0 | Status: COMPLETED | OUTPATIENT
Start: 2020-02-17 | End: 2020-02-17

## 2020-02-17 NOTE — ED ADULT TRIAGE NOTE - CHIEF COMPLAINT QUOTE
Patient complaining of persistent cough and chest discomfort x2days and worsening despite being seen by PCP an given medications.

## 2020-02-18 VITALS
RESPIRATION RATE: 20 BRPM | OXYGEN SATURATION: 98 % | DIASTOLIC BLOOD PRESSURE: 61 MMHG | SYSTOLIC BLOOD PRESSURE: 120 MMHG | HEART RATE: 86 BPM | TEMPERATURE: 98 F

## 2020-02-18 PROCEDURE — 71046 X-RAY EXAM CHEST 2 VIEWS: CPT | Mod: 26

## 2020-02-18 RX ORDER — AZITHROMYCIN 500 MG/1
1 TABLET, FILM COATED ORAL
Qty: 6 | Refills: 0
Start: 2020-02-18 | End: 2020-02-22

## 2020-02-18 RX ORDER — BUDESONIDE AND FORMOTEROL FUMARATE DIHYDRATE 160; 4.5 UG/1; UG/1
2 AEROSOL RESPIRATORY (INHALATION)
Qty: 1 | Refills: 0
Start: 2020-02-18 | End: 2020-03-18

## 2020-02-18 RX ORDER — ALBUTEROL 90 UG/1
2 AEROSOL, METERED ORAL
Qty: 1 | Refills: 0
Start: 2020-02-18 | End: 2020-03-18

## 2020-02-18 RX ADMIN — Medication 3 MILLILITER(S): at 00:54

## 2020-02-18 RX ADMIN — Medication 12 MILLIGRAM(S): at 00:50

## 2020-02-18 NOTE — ED PROVIDER NOTE - PHYSICAL EXAMINATION
VITAL SIGNS: I have reviewed nursing notes and confirm.  CONSTITUTIONAL: Well-developed; well-nourished; in no acute distress, well hydrated  SKIN: Skin exam is warm and dry, no acute rash, good turgor  HEAD: Normocephalic; atraumatic.  EYES: PERRL, EOM intact; conjunctiva and sclera clear.  ENT: clear rhinorrhea, edematous turbinates, no pharyngeal eyrthema, normal appearing TMs  NECK: Supple; non tender, no significant adenopathy  CARD: S1, S2 normal; no murmurs, gallops, or rubs. Regular rate and rhythm.  RESP: trace diffuse wheezes, no rales or rhonchi   ABD: Normal bowel sounds; soft; non-distended; non-tender  EXT: Normal ROM.   NEURO: Alert, oriented. Grossly unremarkable. No focal deficits.  PSYCH: Cooperative, appropriate.

## 2020-02-18 NOTE — ED PROVIDER NOTE - CLINICAL SUMMARY MEDICAL DECISION MAKING FREE TEXT BOX
Patient feeling much better after dex and nebs, no further wheezing, EKG and CXR negative -- will dc with close follow up, albuterol prn, tessalon perles for cough.

## 2020-02-18 NOTE — ED PROVIDER NOTE - OBJECTIVE STATEMENT
45 yo F, history of asthma/COPD currently on 45 yo F, history of asthma/COPD currently on montelukasts, symbicort and prn albuterol here for assessment of wheezing in setting of recent URI -- patient was [place], developed rhinorrhea, post nasal drip and cough. No fever, chills. Since then has had wheezing, requiring increase in albuterol use.     No exertional dyspnea, no CP.     No sick contacts but was on a cruise.     Never intubated, never in ICU.

## 2020-02-18 NOTE — ED ADULT NURSE NOTE - OBJECTIVE STATEMENT
pt c/o cough and congestion. pt aox4 respirations even / unlabored. duoneb given, meds administered as per order. ok for dc as per md. Will continue to monitor / asses

## 2020-02-18 NOTE — ED PROVIDER NOTE - PATIENT PORTAL LINK FT
You can access the FollowMyHealth Patient Portal offered by [facility] by registering at the following website: [email or web address]. By joining [employer]’s FollowMyHealth portal, you will also be able to view your health information using other applications (apps) compatible with our system.

## 2020-02-18 NOTE — ED PROVIDER NOTE - NSFOLLOWUPINSTRUCTIONS_ED_ALL_ED_FT
Viral Respiratory Infection    A viral respiratory infection is an illness that affects parts of the body used for breathing, like the lungs, nose, and throat. It is caused by a germ called a virus. Symptoms can include runny nose, coughing, sneezing, fatigue, body aches, sore throat, fever, or headache. Over the counter medicine can be used to manage the symptoms but the infection typically goes away on its own in 5 to 10 days.     SEEK IMMEDIATE MEDICAL CARE IF YOU HAVE ANY OF THE FOLLOWING SYMPTOMS: shortness of breath, chest pain, fever over 10 days, or lightheadedness/dizziness.    Asthma    Asthma is a condition in which the airways tighten and narrow, making it difficult to breath. Asthma episodes, also called asthma attacks, range from minor to life-threatening. Symptoms include wheezing, coughing, chest tightness, or shortness of breath. The diagnosis of asthma is made by a review of your medical history and a physical exam, but may involve additional testing. Asthma cannot be cured, but medicines and lifestyle changes can help control it. Avoid triggers of asthma which may include animal dander, pollen, mold, smoke, air pollutants, etc.     SEEK IMMEDIATE MEDICAL CARE IF YOU HAVE ANY OF THE FOLLOWING SYMPTOMS: worsening of symptoms, shortness of breath at rest, chest pain, bluish discoloration to lips or fingertips, lightheadedness/dizziness, or fever.

## 2020-08-02 ENCOUNTER — EMERGENCY (EMERGENCY)
Facility: HOSPITAL | Age: 45
LOS: 1 days | Discharge: HOME | End: 2020-08-02
Attending: EMERGENCY MEDICINE | Admitting: EMERGENCY MEDICINE
Payer: MEDICAID

## 2020-08-02 VITALS
HEART RATE: 80 BPM | TEMPERATURE: 98 F | DIASTOLIC BLOOD PRESSURE: 92 MMHG | OXYGEN SATURATION: 98 % | SYSTOLIC BLOOD PRESSURE: 153 MMHG | RESPIRATION RATE: 17 BRPM

## 2020-08-02 VITALS
OXYGEN SATURATION: 100 % | HEART RATE: 90 BPM | RESPIRATION RATE: 16 BRPM | TEMPERATURE: 98 F | DIASTOLIC BLOOD PRESSURE: 96 MMHG | SYSTOLIC BLOOD PRESSURE: 142 MMHG

## 2020-08-02 LAB
ALBUMIN SERPL ELPH-MCNC: 4 G/DL — SIGNIFICANT CHANGE UP (ref 3.3–5)
ALP SERPL-CCNC: 52 U/L — SIGNIFICANT CHANGE UP (ref 40–120)
ALT FLD-CCNC: 10 U/L — SIGNIFICANT CHANGE UP (ref 4–33)
ANION GAP SERPL CALC-SCNC: 12 MMO/L — SIGNIFICANT CHANGE UP (ref 7–14)
AST SERPL-CCNC: 11 U/L — SIGNIFICANT CHANGE UP (ref 4–32)
BASOPHILS # BLD AUTO: 0.04 K/UL — SIGNIFICANT CHANGE UP (ref 0–0.2)
BASOPHILS NFR BLD AUTO: 0.4 % — SIGNIFICANT CHANGE UP (ref 0–2)
BILIRUB SERPL-MCNC: < 0.2 MG/DL — LOW (ref 0.2–1.2)
BUN SERPL-MCNC: 14 MG/DL — SIGNIFICANT CHANGE UP (ref 7–23)
CALCIUM SERPL-MCNC: 8.7 MG/DL — SIGNIFICANT CHANGE UP (ref 8.4–10.5)
CHLORIDE SERPL-SCNC: 102 MMOL/L — SIGNIFICANT CHANGE UP (ref 98–107)
CO2 SERPL-SCNC: 23 MMOL/L — SIGNIFICANT CHANGE UP (ref 22–31)
CREAT SERPL-MCNC: 0.68 MG/DL — SIGNIFICANT CHANGE UP (ref 0.5–1.3)
EOSINOPHIL # BLD AUTO: 0.55 K/UL — HIGH (ref 0–0.5)
EOSINOPHIL NFR BLD AUTO: 5.7 % — SIGNIFICANT CHANGE UP (ref 0–6)
GLUCOSE SERPL-MCNC: 113 MG/DL — HIGH (ref 70–99)
HCT VFR BLD CALC: 33.4 % — LOW (ref 34.5–45)
HGB BLD-MCNC: 10.9 G/DL — LOW (ref 11.5–15.5)
IMM GRANULOCYTES NFR BLD AUTO: 0.3 % — SIGNIFICANT CHANGE UP (ref 0–1.5)
LYMPHOCYTES # BLD AUTO: 2.87 K/UL — SIGNIFICANT CHANGE UP (ref 1–3.3)
LYMPHOCYTES # BLD AUTO: 29.7 % — SIGNIFICANT CHANGE UP (ref 13–44)
MCHC RBC-ENTMCNC: 30.2 PG — SIGNIFICANT CHANGE UP (ref 27–34)
MCHC RBC-ENTMCNC: 32.6 % — SIGNIFICANT CHANGE UP (ref 32–36)
MCV RBC AUTO: 92.5 FL — SIGNIFICANT CHANGE UP (ref 80–100)
MONOCYTES # BLD AUTO: 0.79 K/UL — SIGNIFICANT CHANGE UP (ref 0–0.9)
MONOCYTES NFR BLD AUTO: 8.2 % — SIGNIFICANT CHANGE UP (ref 2–14)
NEUTROPHILS # BLD AUTO: 5.39 K/UL — SIGNIFICANT CHANGE UP (ref 1.8–7.4)
NEUTROPHILS NFR BLD AUTO: 55.7 % — SIGNIFICANT CHANGE UP (ref 43–77)
NRBC # FLD: 0 K/UL — SIGNIFICANT CHANGE UP (ref 0–0)
PLATELET # BLD AUTO: 278 K/UL — SIGNIFICANT CHANGE UP (ref 150–400)
PMV BLD: 10 FL — SIGNIFICANT CHANGE UP (ref 7–13)
POTASSIUM SERPL-MCNC: 3.7 MMOL/L — SIGNIFICANT CHANGE UP (ref 3.5–5.3)
POTASSIUM SERPL-SCNC: 3.7 MMOL/L — SIGNIFICANT CHANGE UP (ref 3.5–5.3)
PROT SERPL-MCNC: 6.8 G/DL — SIGNIFICANT CHANGE UP (ref 6–8.3)
RBC # BLD: 3.61 M/UL — LOW (ref 3.8–5.2)
RBC # FLD: 13.1 % — SIGNIFICANT CHANGE UP (ref 10.3–14.5)
SODIUM SERPL-SCNC: 137 MMOL/L — SIGNIFICANT CHANGE UP (ref 135–145)
WBC # BLD: 9.67 K/UL — SIGNIFICANT CHANGE UP (ref 3.8–10.5)
WBC # FLD AUTO: 9.67 K/UL — SIGNIFICANT CHANGE UP (ref 3.8–10.5)

## 2020-08-02 PROCEDURE — 70491 CT SOFT TISSUE NECK W/DYE: CPT | Mod: 26

## 2020-08-02 PROCEDURE — 99284 EMERGENCY DEPT VISIT MOD MDM: CPT

## 2020-08-02 RX ORDER — IBUPROFEN 200 MG
600 TABLET ORAL ONCE
Refills: 0 | Status: COMPLETED | OUTPATIENT
Start: 2020-08-02 | End: 2020-08-02

## 2020-08-02 RX ADMIN — Medication 600 MILLIGRAM(S): at 05:57

## 2020-08-02 NOTE — ED ADULT TRIAGE NOTE - CHIEF COMPLAINT QUOTE
Patient c/o right neck swelling x6 days. Patient denies any fevers/chills. Patient reports pain when swallowing, denies any difficulty breathing, patient speaking in full sentences. Hx. COPD, asthma.

## 2020-08-02 NOTE — ED PROVIDER NOTE - PATIENT PORTAL LINK FT
You can access the FollowMyHealth Patient Portal offered by [facility] by registering at the following website: [email or web address]. By joining [employer]’s FollowMyHealth portal, you will also be able to view your health information using other applications (apps) compatible with our system.

## 2020-08-02 NOTE — ED PROVIDER NOTE - ATTENDING CONTRIBUTION TO CARE
45 y/o F with h/o asthma/COPD (no O2 requirement), former smoker here with 1 week of progressive R sided neck swelling.  Pt reports swelling and pain to right side of neck and chin swelling.  No associated fever, chills, neck pain or stiffness, cough, drooling, SOB.  No dental pain.  No recent trauma, injury, dental work.  Pt has distant h/o liposuction to neck 8 years ago but no issues since.  Well appearing, lying comfortably in stretcher, awake and alert, nontoxic.  VSS.  NCAT Oropharynx clear, uvula midline no drooling or stridor, no trismus.  No sublingual swelling, teeth grossly normal and no e/o gingival swelling or erythema.  (+)palpable tender area of swelling to R submandibular region, no palpable fluctuance.  Lungs cta bl.  Cards nl S1/S2, RRR, no MRG.  Abd soft ntnd.  Plan for labs, ct neck eval for mass, abscess, reassess.

## 2020-08-02 NOTE — ED PROVIDER NOTE - OBJECTIVE STATEMENT
43 y/o female with hx of COPD presents to the ED c/o right sided neck swelling x 1 week. Reports pain in the right sided neck that seems to be worsening. Reports pain sometimes increases when she opens her mouth. Denies sore throat. Denies difficulty swallowing or tolerating secretions. No fever, chills, nausea, vomiting, CP, SOB. She quit smoking in October after an extensive history.

## 2020-08-02 NOTE — ED ADULT NURSE NOTE - OBJECTIVE STATEMENT
Patient is a 44-year-old female A&OX4 ambulatory, with a Phx of asthma complaining of neck pain on the right side. Patient says she started noticing her neck swelling starting 5 days ago. Patient speaking in full sentences. No SOB. Patient denies fevers, nausea, vomiting, fevers, chills. Patient respirations are even and unlabored, chest rise equal b/l. Patient with a 20 G placed In L AC. Labs drawn and sent. VSS. NAD UCG neg. Awaiting CT scan. Will continue to monitor.

## 2020-08-02 NOTE — ED PROVIDER NOTE - NSFOLLOWUPINSTRUCTIONS_ED_ALL_ED_FT
Please follow up with ENT regarding your thyroid nodule. Use ibuprofen 600mg every 6 hours for your throat pain. Return to the ED for worsening pain, difficulty swallowing, vomiting, or other concerning symptoms.

## 2020-08-02 NOTE — ED PROVIDER NOTE - CLINICAL SUMMARY MEDICAL DECISION MAKING FREE TEXT BOX
43 y/o female with hx of COPD presents to the ED c/o right sided neck swelling and pain x 1 week. Vitals stable. Exam with +tenderness to palpation in the right submandibular region with some adjacent lymphadenopathy. Will obtain CT neck with contrast to evaluate for mass vs abscess vs inflammatory changes and check basic labs. Reassess following labs and imaging. Chi Porras DO

## 2020-09-07 ENCOUNTER — EMERGENCY (EMERGENCY)
Facility: HOSPITAL | Age: 45
LOS: 1 days | Discharge: HOME | End: 2020-09-07
Attending: EMERGENCY MEDICINE | Admitting: EMERGENCY MEDICINE
Payer: MEDICAID

## 2020-09-07 VITALS
TEMPERATURE: 98 F | DIASTOLIC BLOOD PRESSURE: 98 MMHG | OXYGEN SATURATION: 98 % | HEART RATE: 94 BPM | RESPIRATION RATE: 16 BRPM | HEIGHT: 62 IN | SYSTOLIC BLOOD PRESSURE: 145 MMHG | WEIGHT: 190.04 LBS

## 2020-09-07 LAB
ALBUMIN SERPL ELPH-MCNC: 4.1 G/DL — SIGNIFICANT CHANGE UP (ref 3.3–5)
ALP SERPL-CCNC: 54 U/L — SIGNIFICANT CHANGE UP (ref 40–120)
ALT FLD-CCNC: 16 U/L — SIGNIFICANT CHANGE UP (ref 4–33)
ANION GAP SERPL CALC-SCNC: 13 MMO/L — SIGNIFICANT CHANGE UP (ref 7–14)
AST SERPL-CCNC: 20 U/L — SIGNIFICANT CHANGE UP (ref 4–32)
BASOPHILS # BLD AUTO: 0.02 K/UL — SIGNIFICANT CHANGE UP (ref 0–0.2)
BASOPHILS NFR BLD AUTO: 0.2 % — SIGNIFICANT CHANGE UP (ref 0–2)
BILIRUB SERPL-MCNC: < 0.2 MG/DL — LOW (ref 0.2–1.2)
BUN SERPL-MCNC: 13 MG/DL — SIGNIFICANT CHANGE UP (ref 7–23)
CALCIUM SERPL-MCNC: 9.2 MG/DL — SIGNIFICANT CHANGE UP (ref 8.4–10.5)
CHLORIDE SERPL-SCNC: 104 MMOL/L — SIGNIFICANT CHANGE UP (ref 98–107)
CO2 SERPL-SCNC: 23 MMOL/L — SIGNIFICANT CHANGE UP (ref 22–31)
CREAT SERPL-MCNC: 0.82 MG/DL — SIGNIFICANT CHANGE UP (ref 0.5–1.3)
EOSINOPHIL # BLD AUTO: 0.29 K/UL — SIGNIFICANT CHANGE UP (ref 0–0.5)
EOSINOPHIL NFR BLD AUTO: 3 % — SIGNIFICANT CHANGE UP (ref 0–6)
GLUCOSE SERPL-MCNC: 108 MG/DL — HIGH (ref 70–99)
HCT VFR BLD CALC: 34.5 % — SIGNIFICANT CHANGE UP (ref 34.5–45)
HGB BLD-MCNC: 11.2 G/DL — LOW (ref 11.5–15.5)
IMM GRANULOCYTES NFR BLD AUTO: 0.3 % — SIGNIFICANT CHANGE UP (ref 0–1.5)
LYMPHOCYTES # BLD AUTO: 2.36 K/UL — SIGNIFICANT CHANGE UP (ref 1–3.3)
LYMPHOCYTES # BLD AUTO: 24.5 % — SIGNIFICANT CHANGE UP (ref 13–44)
MCHC RBC-ENTMCNC: 30.3 PG — SIGNIFICANT CHANGE UP (ref 27–34)
MCHC RBC-ENTMCNC: 32.5 % — SIGNIFICANT CHANGE UP (ref 32–36)
MCV RBC AUTO: 93.2 FL — SIGNIFICANT CHANGE UP (ref 80–100)
MONOCYTES # BLD AUTO: 0.68 K/UL — SIGNIFICANT CHANGE UP (ref 0–0.9)
MONOCYTES NFR BLD AUTO: 7.1 % — SIGNIFICANT CHANGE UP (ref 2–14)
NEUTROPHILS # BLD AUTO: 6.25 K/UL — SIGNIFICANT CHANGE UP (ref 1.8–7.4)
NEUTROPHILS NFR BLD AUTO: 64.9 % — SIGNIFICANT CHANGE UP (ref 43–77)
NRBC # FLD: 0 K/UL — SIGNIFICANT CHANGE UP (ref 0–0)
PLATELET # BLD AUTO: 318 K/UL — SIGNIFICANT CHANGE UP (ref 150–400)
PMV BLD: 10.1 FL — SIGNIFICANT CHANGE UP (ref 7–13)
POTASSIUM SERPL-MCNC: 4 MMOL/L — SIGNIFICANT CHANGE UP (ref 3.5–5.3)
POTASSIUM SERPL-SCNC: 4 MMOL/L — SIGNIFICANT CHANGE UP (ref 3.5–5.3)
PROT SERPL-MCNC: 7 G/DL — SIGNIFICANT CHANGE UP (ref 6–8.3)
RBC # BLD: 3.7 M/UL — LOW (ref 3.8–5.2)
RBC # FLD: 13.6 % — SIGNIFICANT CHANGE UP (ref 10.3–14.5)
SODIUM SERPL-SCNC: 140 MMOL/L — SIGNIFICANT CHANGE UP (ref 135–145)
WBC # BLD: 9.63 K/UL — SIGNIFICANT CHANGE UP (ref 3.8–10.5)
WBC # FLD AUTO: 9.63 K/UL — SIGNIFICANT CHANGE UP (ref 3.8–10.5)

## 2020-09-07 PROCEDURE — 99284 EMERGENCY DEPT VISIT MOD MDM: CPT

## 2020-09-07 RX ORDER — IBUPROFEN 200 MG
600 TABLET ORAL ONCE
Refills: 0 | Status: COMPLETED | OUTPATIENT
Start: 2020-09-07 | End: 2020-09-07

## 2020-09-07 RX ADMIN — Medication 600 MILLIGRAM(S): at 22:14

## 2020-09-07 RX ADMIN — Medication 50 MILLIGRAM(S): at 22:14

## 2020-09-07 NOTE — ED PROVIDER NOTE - CLINICAL SUMMARY MEDICAL DECISION MAKING FREE TEXT BOX
45 year old female with history of COPD, asthma, and recently dx'd thyroid nodule p/w worsening R neck pain radiating up to face. Patient is nontoxic appearing here with vital signs stable within normal ranges 45 year old female with history of COPD, asthma, and recently dx'd thyroid nodule p/w worsening R neck pain radiating up to face. Patient is nontoxic appearing here with vital signs stable within normal ranges. Speaking full sentences without difficulty and clearing secretions well. Given progression of pain, will repeat CT scan of neck with contrast to assess for changes, and administer anti-inflammatories. Also check labs including TSH. Pending normal labs and unchanged imaging, likely DC with outpatient f/u with ENT.

## 2020-09-07 NOTE — ED PROVIDER NOTE - ATTENDING CONTRIBUTION TO CARE
DR. MIRANDA, ATTENDING MD-  I performed a face to face bedside interview with the patient regarding history of present illness, review of symptoms and past medical history. I completed an independent physical exam.  I have discussed the patient's plan of care with the resident.   Documentation as above in the note.    46 y/o female h/o copd, thyroid nodule here with worsening right sided neck and facial pain.  Pt states that since dx one month ago has had gradually worsening pain and swelling.  Has been unable to see ENT as unable to schedule appt.  Pain particularly worse with swallowing.  Denies voice change or difficulty breathing.  Pt appears uncomfortable, handling secretions, clear phonation, no stridor, lmtd neck turning left d/t pain, ttp right neck, right submandib region, no trismus.  Evaluate for abscess, submandib salivary stone, check thyroid function tests, ct neck, give pain med, anti-inflammatories, reassess.

## 2020-09-07 NOTE — ED PROVIDER NOTE - PHYSICAL EXAMINATION
Gen - NAD; speaks full sentences; A+Ox3   HEENT - NCAT, EOMI, PERRL, moist mucous membranes, no erythema or edema in the posterior oropharynx, uvula midline  Neck - supple, tender to palpation R>L anterior/lateral neck  Resp - CTAB,  symmetric breath sounds, no wheezes/rales  CV -  RRR, no murmurs  Abd - soft, NT, ND; no guarding or rebound, no masses  MSK - 5/5 strength and FROM b/l UE and LE  Extrem - 3+ distal pulses b/L UE and LE  Skin - no rash or bruising, warm and well perfused  Neuro - no focal motor or sensation deficits

## 2020-09-07 NOTE — ED PROVIDER NOTE - NSFOLLOWUPINSTRUCTIONS_ED_ALL_ED_FT
1. TAKE ALL MEDICATIONS AS DIRECTED.    2. FOR PAIN OR FEVER YOU CAN TAKE IBUPROFEN (MOTRIN, ADVIL) OR ACETAMINOPHEN (TYLENOL) AS NEEDED, AS DIRECTED ON PACKAGING.  3. FOLLOW UP WITH YOUR PRIMARY DOCTOR WITHIN 5 DAYS AS DIRECTED.  4. IF YOU HAD LABS OR IMAGING DONE, YOU WERE GIVEN COPIES OF ALL LABS AND/OR IMAGING RESULTS FROM YOUR ER VISIT--PLEASE TAKE THEM WITH YOU TO YOUR FOLLOW UP APPOINTMENTS.  5. IF NEEDED, CALL PATIENT ACCESS SERVICES AT [phone] TO FIND A PRIMARY CARE PHYSICIAN.  OR CALL [phone] TO MAKE AN APPOINTMENT WITH THE CLINIC.  6. RETURN TO THE ER FOR ANY WORSENING SYMPTOMS OR CONCERNS.     Please follow up with ENT for further evaluation of your thyroid nodule.

## 2020-09-07 NOTE — ED PROVIDER NOTE - PROGRESS NOTE DETAILS
Patient re-assessed, notes mild improvement in pain. Awaiting CT. -[place] PGY-1 Ct results reviewed. Nodule stable. Again recommending follow up ultrasound. results dw pt/family, questions answered. Will dc with ENT follow up.

## 2020-09-07 NOTE — ED PROVIDER NOTE - PATIENT PORTAL LINK FT
You can access the FollowMyHealth Patient Portal offered by [facility] by registering at the following website: [email or web address]. By joining [employer]’s FollowMyHealth portal, you will also be able to view your health information using other applications (apps) compatible with our system.

## 2020-09-07 NOTE — ED PROVIDER NOTE - NSFOLLOWUPCLINICS_GEN_ALL_ED_FT
[facility]  Otolaryngology (ENT)  [address], [facility] [address]  [place] [address]  Phone: [phone]  Fax:   Follow Up Time:     [facility] - ENT  Otolaryngology (ENT)  [address] [place] [address]  Phone: [phone]  Fax:   Follow Up Time:     [facility]  [facility] (ENT)  [address] [address] [place] [address]  Phone: [phone]  Fax:   Follow Up Time:

## 2020-09-07 NOTE — ED PROVIDER NOTE - NS ED ROS FT
Gen: No fever, no weight loss, no fatigue  CV: No chest pain, no palpitations  HEENT: +sore throat, no hoarseness  Skin: No rash, no color changes  Resp: No SOB, +cough  Endo: No sensitivity to heat or cold, no appetite changes  GI: No constipation, no diarrhea, no nausea, no vomiting  Msk: No back pain, no LE swelling, no extremity pain  : No dysuria, no increased frequency  Neuro: No LOC, no weakness, no numbness

## 2020-09-07 NOTE — ED PROVIDER NOTE - OBJECTIVE STATEMENT
45 year old female with history of COPD, asthma and recent finding of thyroid nodule on CT here p/w continued R sided neck pain x 1 month. Pt was discharged 1 mo ago with ENT follow up after being evaluated for the same R neck pain, was seen by ENT and sent for ultrasound, has not received results or instructions for follow-up. Patient continued to have R neck pain that radiates up the R face, temple and head, worse with swallowing. Pt also endorses diarrhea in past 2-3 days but otherwise denies fevers/chills, choking episodes, shortness of breath, drooling, nausea/vomiting, cough. 45 year old female with history of COPD, asthma and recent finding of thyroid nodule on CT here p/w continued R sided neck pain x 1 month. Pt was discharged 1 mo ago with ENT follow up after being evaluated for the same R neck pain, was seen by ENT and sent for ultrasound, has not received results or instructions for follow-up. Patient continued to have R neck pain that radiates up the R face, temple and head, worse with swallowing, now worsening pain. Pt also endorses diarrhea in past 2-3 days but otherwise denies fevers/chills, choking episodes, shortness of breath, drooling, nausea/vomiting, cough.

## 2020-09-07 NOTE — ED ADULT NURSE NOTE - OBJECTIVE STATEMENT
received pt to intake 7 aox4 in no apparent distress VSS c/o R neck pain x 1 month. Pt was seen at ED a month ago and told she has a thyroid nodule, instructed to see ENT. Pt states saw ENT and went for US but has not received results and pain is getting worse. Pt denies difficulty/painful swallowing. Denies difficulty breathing, fevers, chills. Breaths equal and unlabored, 18 G PIV L AC placed labs sent UCG running will medicate as per order will continue to monitor

## 2020-09-07 NOTE — ED ADULT TRIAGE NOTE - CHIEF COMPLAINT QUOTE
Pt was diagnosed with a thyroid nodule on 8/2 and had an ultrasound on 8/18 at an outside facility and has not received the results yet c/o pain and swelling to  her throat area no difficulty breathing, no drooling noted POX 97 on room air   PMH: COPD, PNA

## 2020-09-08 VITALS
DIASTOLIC BLOOD PRESSURE: 79 MMHG | HEART RATE: 89 BPM | OXYGEN SATURATION: 98 % | TEMPERATURE: 98 F | RESPIRATION RATE: 16 BRPM | SYSTOLIC BLOOD PRESSURE: 140 MMHG

## 2020-09-08 PROCEDURE — 70491 CT SOFT TISSUE NECK W/DYE: CPT | Mod: 26

## 2020-09-08 RX ORDER — IBUPROFEN 200 MG
1 TABLET ORAL
Qty: 24 | Refills: 0
Start: 2020-09-08 | End: 2020-09-13

## 2020-09-08 RX ADMIN — Medication 600 MILLIGRAM(S): at 00:19

## 2021-05-23 NOTE — ED ADULT NURSE NOTE - PMH
Anemia    Asthma  last attack was one year ago; Never intubated or hospitalized  Gestational diabetes    Umbilical hernia spontaneous rupture

## 2021-12-02 NOTE — ED ADULT NURSE NOTE - NSFALLRSKASSESASSIST_ED_ALL_ED
Detail Level: Detailed Quality 110: Preventive Care And Screening: Influenza Immunization: Influenza immunization was not ordered or administered, reason not given no

## 2022-11-07 NOTE — ED ADULT TRIAGE NOTE - ESI TRIAGE ACUITY LEVEL, MLM
3 Doxepin Pregnancy And Lactation Text: This medication is Pregnancy Category C and it isn't known if it is safe during pregnancy. It is also excreted in breast milk and breast feeding isn't recommended.

## 2023-01-19 NOTE — ED ADULT TRIAGE NOTE - NS ED NURSE DIRECT TO ROOM YN
Patient requested an appointment online with Dr. Ricks for knee pain. Called to triage her symptoms. She wants to be seen today and states she prefers to go to Urgent care than wait for an appointment tomorrow.    No

## 2023-08-15 ENCOUNTER — APPOINTMENT (OUTPATIENT)
Dept: PULMONOLOGY | Facility: CLINIC | Age: 48
End: 2023-08-15

## 2024-05-06 ENCOUNTER — INPATIENT (INPATIENT)
Facility: HOSPITAL | Age: 49
LOS: 2 days | Discharge: HOME | End: 2024-05-09
Attending: STUDENT IN AN ORGANIZED HEALTH CARE EDUCATION/TRAINING PROGRAM | Admitting: STUDENT IN AN ORGANIZED HEALTH CARE EDUCATION/TRAINING PROGRAM
Payer: MEDICAID

## 2024-05-06 VITALS — RESPIRATION RATE: 18 BRPM | OXYGEN SATURATION: 96 % | TEMPERATURE: 98 F | HEART RATE: 79 BPM

## 2024-05-06 PROCEDURE — 99285 EMERGENCY DEPT VISIT HI MDM: CPT

## 2024-05-06 RX ORDER — ONDANSETRON 8 MG/1
4 TABLET, FILM COATED ORAL ONCE
Refills: 0 | Status: COMPLETED | OUTPATIENT
Start: 2024-05-06 | End: 2024-05-06

## 2024-05-06 RX ORDER — ACETAMINOPHEN 500 MG
1000 TABLET ORAL ONCE
Refills: 0 | Status: COMPLETED | OUTPATIENT
Start: 2024-05-06 | End: 2024-05-06

## 2024-05-06 RX ORDER — SODIUM CHLORIDE 9 MG/ML
1000 INJECTION INTRAMUSCULAR; INTRAVENOUS; SUBCUTANEOUS ONCE
Refills: 0 | Status: COMPLETED | OUTPATIENT
Start: 2024-05-06 | End: 2024-05-06

## 2024-05-06 RX ORDER — MORPHINE SULFATE 50 MG/1
4 CAPSULE, EXTENDED RELEASE ORAL ONCE
Refills: 0 | Status: DISCONTINUED | OUTPATIENT
Start: 2024-05-06 | End: 2024-05-06

## 2024-05-06 RX ADMIN — ONDANSETRON 4 MILLIGRAM(S): 8 TABLET, FILM COATED ORAL at 23:57

## 2024-05-06 RX ADMIN — Medication 400 MILLIGRAM(S): at 23:57

## 2024-05-06 RX ADMIN — SODIUM CHLORIDE 1000 MILLILITER(S): 9 INJECTION INTRAMUSCULAR; INTRAVENOUS; SUBCUTANEOUS at 23:57

## 2024-05-06 NOTE — ED PROVIDER NOTE - NSICDXPASTMEDICALHX_GEN_ALL_CORE_FT
PAST MEDICAL HISTORY:  Anemia     Asthma last attack was one year ago; Never intubated or hospitalized    Gestational diabetes     Umbilical hernia

## 2024-05-06 NOTE — ED PROVIDER NOTE - CLINICAL SUMMARY MEDICAL DECISION MAKING FREE TEXT BOX
Sienna Barrientos, ED Attendin-year-old female with history of abdominal wall hernia status postrepair many years ago, planning for repeat repair in July presenting with nausea, vomiting, no bowel movement for 2 days.  On exam, vital signs stable, patient very uncomfortable appearing with palpable hernia, unable to reduce at bedside diffuse tenderness to palpation.  Concern for strangulated versus incarcerated hernia complicated by possible SBO.  Plan for labs to assess abdominal enzymes, electrolytes, lactate.  Plan for CT of the abdomen pelvis with IV contrast.  Will keep patient n.p.o. and plan for pain control, antiemetics.  Reassess to dispo.

## 2024-05-06 NOTE — ED PROVIDER NOTE - OBJECTIVE STATEMENT
48-year-old female, Medical history of COPD, abdominal hernia repair presenting with chief complaint of nausea, vomiting since around 3 PM today.  Of note patient has been having increasing abdominal pain for the past 2 days and no bowel movement for the past 2 days.  Painful flatus.  No history of any other abdominal surgeries.  Denies any fevers, chills, urinary symptoms.  No other complaints at this time.

## 2024-05-06 NOTE — ED PROVIDER NOTE - NSICDXFAMILYHX_GEN_ALL_CORE_FT
FAMILY HISTORY:  Mother  Still living? Unknown  Family history of diabetes mellitus, Age at diagnosis: Age Unknown    Aunt  Still living? Unknown  Family history of diabetes mellitus, Age at diagnosis: Age Unknown

## 2024-05-06 NOTE — ED ADULT TRIAGE NOTE - CHIEF COMPLAINT QUOTE
Pt c/o umbilical pain with nausea states known hernia. Denies any fever, chills. Hx COPD, HLD, asthma, anemia

## 2024-05-06 NOTE — ED PROVIDER NOTE - PROGRESS NOTE DETAILS
Patient still with pain, given read on exam, concern for incarcerated hernia.  Updated on findings, surgery consulted.  Patient declines morphine, will order Toradol. Sienna Barrientos, ED Attending Will admit to surgery. Sienna Barrientos, ED Attending

## 2024-05-06 NOTE — ED PROVIDER NOTE - INTERNATIONAL TRAVEL
Your external cephalic version (ECV) is scheduled at 1pm on Friday. Please come to labor and delivery at 1pm. Do not eat or drink after midnight the night before your procedure.   
No

## 2024-05-06 NOTE — ED PROVIDER NOTE - PHYSICAL EXAMINATION
Const: Well-nourished, Well-developed, appearing stated age.  Eyes: no conjunctival injection, and symmetrical lids.  HEENT: Head NCAT, no lesions. Atraumatic external nose and ears.   Neck: Symmetric, trachea midline.   CVS: +S1/S2, Radial and DP pulses 2+ b/l.   RESP: Unlabored respiratory effort. Clear to auscultation bilaterally.  GI: +Abdominal wall hernia- unable to reduce w significant ttp, +distended, No CVA tenderness b/l.   MSK: Normocephalic/Atraumatic, Lower Extremities w/o edema b/l.   Skin: Warm, dry and intact.   Neuro: Fluent Speech. Moving all extremities.   Psych: Awake, Alert, & Oriented (AAO) x3. Appropriate mood and affect.

## 2024-05-06 NOTE — ED PROVIDER NOTE - NSICDXPASTSURGICALHX_GEN_ALL_CORE_FT
PAST SURGICAL HISTORY:  Legal  x2    S/P dilatation and curettage for missed     S/P hernia repair

## 2024-05-07 DIAGNOSIS — K43.9 VENTRAL HERNIA WITHOUT OBSTRUCTION OR GANGRENE: ICD-10-CM

## 2024-05-07 LAB
ALBUMIN SERPL ELPH-MCNC: 3.9 G/DL — SIGNIFICANT CHANGE UP (ref 3.3–5)
ALP SERPL-CCNC: 67 U/L — SIGNIFICANT CHANGE UP (ref 40–120)
ALT FLD-CCNC: 9 U/L — SIGNIFICANT CHANGE UP (ref 4–33)
ANION GAP SERPL CALC-SCNC: 14 MMOL/L — SIGNIFICANT CHANGE UP (ref 7–14)
APPEARANCE UR: CLEAR — SIGNIFICANT CHANGE UP
APTT BLD: 28.9 SEC — SIGNIFICANT CHANGE UP (ref 24.5–35.6)
AST SERPL-CCNC: 14 U/L — SIGNIFICANT CHANGE UP (ref 4–32)
BACTERIA # UR AUTO: NEGATIVE /HPF — SIGNIFICANT CHANGE UP
BASE EXCESS BLDV CALC-SCNC: -1.5 MMOL/L — SIGNIFICANT CHANGE UP (ref -2–3)
BASOPHILS # BLD AUTO: 0.04 K/UL — SIGNIFICANT CHANGE UP (ref 0–0.2)
BASOPHILS NFR BLD AUTO: 0.4 % — SIGNIFICANT CHANGE UP (ref 0–2)
BILIRUB SERPL-MCNC: <0.2 MG/DL — SIGNIFICANT CHANGE UP (ref 0.2–1.2)
BILIRUB UR-MCNC: NEGATIVE — SIGNIFICANT CHANGE UP
BLD GP AB SCN SERPL QL: NEGATIVE — SIGNIFICANT CHANGE UP
BLOOD GAS VENOUS COMPREHENSIVE RESULT: SIGNIFICANT CHANGE UP
BUN SERPL-MCNC: 12 MG/DL — SIGNIFICANT CHANGE UP (ref 7–23)
CALCIUM SERPL-MCNC: 9.1 MG/DL — SIGNIFICANT CHANGE UP (ref 8.4–10.5)
CAST: 0 /LPF — SIGNIFICANT CHANGE UP (ref 0–4)
CHLORIDE BLDV-SCNC: 106 MMOL/L — SIGNIFICANT CHANGE UP (ref 96–108)
CHLORIDE SERPL-SCNC: 104 MMOL/L — SIGNIFICANT CHANGE UP (ref 98–107)
CO2 BLDV-SCNC: 25 MMOL/L — SIGNIFICANT CHANGE UP (ref 22–26)
CO2 SERPL-SCNC: 20 MMOL/L — LOW (ref 22–31)
COLOR SPEC: YELLOW — SIGNIFICANT CHANGE UP
CREAT SERPL-MCNC: 0.64 MG/DL — SIGNIFICANT CHANGE UP (ref 0.5–1.3)
DIFF PNL FLD: NEGATIVE — SIGNIFICANT CHANGE UP
EGFR: 109 ML/MIN/1.73M2 — SIGNIFICANT CHANGE UP
EOSINOPHIL # BLD AUTO: 0.21 K/UL — SIGNIFICANT CHANGE UP (ref 0–0.5)
EOSINOPHIL NFR BLD AUTO: 2.3 % — SIGNIFICANT CHANGE UP (ref 0–6)
GAS PNL BLDV: 137 MMOL/L — SIGNIFICANT CHANGE UP (ref 136–145)
GLUCOSE BLDV-MCNC: 100 MG/DL — HIGH (ref 70–99)
GLUCOSE SERPL-MCNC: 103 MG/DL — HIGH (ref 70–99)
GLUCOSE UR QL: NEGATIVE MG/DL — SIGNIFICANT CHANGE UP
HCG SERPL-ACNC: <1 MIU/ML — SIGNIFICANT CHANGE UP
HCO3 BLDV-SCNC: 24 MMOL/L — SIGNIFICANT CHANGE UP (ref 22–29)
HCT VFR BLD CALC: 30.8 % — LOW (ref 34.5–45)
HCT VFR BLDA CALC: 29 % — LOW (ref 34.5–46.5)
HGB BLD CALC-MCNC: 9.8 G/DL — LOW (ref 11.7–16.1)
HGB BLD-MCNC: 9.8 G/DL — LOW (ref 11.5–15.5)
IANC: 5.14 K/UL — SIGNIFICANT CHANGE UP (ref 1.8–7.4)
IMM GRANULOCYTES NFR BLD AUTO: 0.2 % — SIGNIFICANT CHANGE UP (ref 0–0.9)
INR BLD: <0.9 RATIO — SIGNIFICANT CHANGE UP (ref 0.85–1.18)
KETONES UR-MCNC: NEGATIVE MG/DL — SIGNIFICANT CHANGE UP
LACTATE BLDV-MCNC: 1.1 MMOL/L — SIGNIFICANT CHANGE UP (ref 0.5–2)
LEUKOCYTE ESTERASE UR-ACNC: NEGATIVE — SIGNIFICANT CHANGE UP
LIDOCAIN IGE QN: 37 U/L — SIGNIFICANT CHANGE UP (ref 7–60)
LYMPHOCYTES # BLD AUTO: 2.76 K/UL — SIGNIFICANT CHANGE UP (ref 1–3.3)
LYMPHOCYTES # BLD AUTO: 30.4 % — SIGNIFICANT CHANGE UP (ref 13–44)
MCHC RBC-ENTMCNC: 26.8 PG — LOW (ref 27–34)
MCHC RBC-ENTMCNC: 31.8 GM/DL — LOW (ref 32–36)
MCV RBC AUTO: 84.2 FL — SIGNIFICANT CHANGE UP (ref 80–100)
MONOCYTES # BLD AUTO: 0.9 K/UL — SIGNIFICANT CHANGE UP (ref 0–0.9)
MONOCYTES NFR BLD AUTO: 9.9 % — SIGNIFICANT CHANGE UP (ref 2–14)
NEUTROPHILS # BLD AUTO: 5.14 K/UL — SIGNIFICANT CHANGE UP (ref 1.8–7.4)
NEUTROPHILS NFR BLD AUTO: 56.8 % — SIGNIFICANT CHANGE UP (ref 43–77)
NITRITE UR-MCNC: NEGATIVE — SIGNIFICANT CHANGE UP
NRBC # BLD: 0 /100 WBCS — SIGNIFICANT CHANGE UP (ref 0–0)
NRBC # FLD: 0 K/UL — SIGNIFICANT CHANGE UP (ref 0–0)
PCO2 BLDV: 41 MMHG — SIGNIFICANT CHANGE UP (ref 39–52)
PH BLDV: 7.37 — SIGNIFICANT CHANGE UP (ref 7.32–7.43)
PH UR: 6.5 — SIGNIFICANT CHANGE UP (ref 5–8)
PLATELET # BLD AUTO: 388 K/UL — SIGNIFICANT CHANGE UP (ref 150–400)
PO2 BLDV: 50 MMHG — HIGH (ref 25–45)
POTASSIUM BLDV-SCNC: 3.6 MMOL/L — SIGNIFICANT CHANGE UP (ref 3.5–5.1)
POTASSIUM SERPL-MCNC: 3.7 MMOL/L — SIGNIFICANT CHANGE UP (ref 3.5–5.3)
POTASSIUM SERPL-SCNC: 3.7 MMOL/L — SIGNIFICANT CHANGE UP (ref 3.5–5.3)
PROT SERPL-MCNC: 6.9 G/DL — SIGNIFICANT CHANGE UP (ref 6–8.3)
PROT UR-MCNC: NEGATIVE MG/DL — SIGNIFICANT CHANGE UP
PROTHROM AB SERPL-ACNC: 10.1 SEC — SIGNIFICANT CHANGE UP (ref 9.5–13)
RBC # BLD: 3.66 M/UL — LOW (ref 3.8–5.2)
RBC # FLD: 16 % — HIGH (ref 10.3–14.5)
RBC CASTS # UR COMP ASSIST: 1 /HPF — SIGNIFICANT CHANGE UP (ref 0–4)
RH IG SCN BLD-IMP: POSITIVE — SIGNIFICANT CHANGE UP
SAO2 % BLDV: 77.6 % — SIGNIFICANT CHANGE UP (ref 67–88)
SODIUM SERPL-SCNC: 138 MMOL/L — SIGNIFICANT CHANGE UP (ref 135–145)
SP GR SPEC: 1.01 — SIGNIFICANT CHANGE UP (ref 1–1.03)
SQUAMOUS # UR AUTO: 2 /HPF — SIGNIFICANT CHANGE UP (ref 0–5)
UROBILINOGEN FLD QL: 0.2 MG/DL — SIGNIFICANT CHANGE UP (ref 0.2–1)
WBC # BLD: 9.07 K/UL — SIGNIFICANT CHANGE UP (ref 3.8–10.5)
WBC # FLD AUTO: 9.07 K/UL — SIGNIFICANT CHANGE UP (ref 3.8–10.5)
WBC UR QL: 1 /HPF — SIGNIFICANT CHANGE UP (ref 0–5)

## 2024-05-07 PROCEDURE — 74177 CT ABD & PELVIS W/CONTRAST: CPT | Mod: 26,MC

## 2024-05-07 PROCEDURE — 99223 1ST HOSP IP/OBS HIGH 75: CPT

## 2024-05-07 PROCEDURE — 93010 ELECTROCARDIOGRAM REPORT: CPT

## 2024-05-07 RX ORDER — ENOXAPARIN SODIUM 100 MG/ML
40 INJECTION SUBCUTANEOUS EVERY 24 HOURS
Refills: 0 | Status: DISCONTINUED | OUTPATIENT
Start: 2024-05-07 | End: 2024-05-08

## 2024-05-07 RX ORDER — ALBUTEROL 90 UG/1
2 AEROSOL, METERED ORAL EVERY 6 HOURS
Refills: 0 | Status: DISCONTINUED | OUTPATIENT
Start: 2024-05-07 | End: 2024-05-09

## 2024-05-07 RX ORDER — KETOROLAC TROMETHAMINE 30 MG/ML
15 SYRINGE (ML) INJECTION ONCE
Refills: 0 | Status: DISCONTINUED | OUTPATIENT
Start: 2024-05-07 | End: 2024-05-07

## 2024-05-07 RX ORDER — FLUTICASONE PROPIONATE 50 MCG
1 SPRAY, SUSPENSION NASAL
Refills: 0 | Status: DISCONTINUED | OUTPATIENT
Start: 2024-05-07 | End: 2024-05-09

## 2024-05-07 RX ORDER — HYDROMORPHONE HYDROCHLORIDE 2 MG/ML
0.25 INJECTION INTRAMUSCULAR; INTRAVENOUS; SUBCUTANEOUS EVERY 6 HOURS
Refills: 0 | Status: DISCONTINUED | OUTPATIENT
Start: 2024-05-07 | End: 2024-05-09

## 2024-05-07 RX ORDER — SODIUM CHLORIDE 9 MG/ML
3 INJECTION INTRAMUSCULAR; INTRAVENOUS; SUBCUTANEOUS ONCE
Refills: 0 | Status: DISCONTINUED | OUTPATIENT
Start: 2024-05-07 | End: 2024-05-07

## 2024-05-07 RX ORDER — HYDROMORPHONE HYDROCHLORIDE 2 MG/ML
0.25 INJECTION INTRAMUSCULAR; INTRAVENOUS; SUBCUTANEOUS EVERY 6 HOURS
Refills: 0 | Status: DISCONTINUED | OUTPATIENT
Start: 2024-05-07 | End: 2024-05-07

## 2024-05-07 RX ORDER — SODIUM CHLORIDE 9 MG/ML
3 INJECTION INTRAMUSCULAR; INTRAVENOUS; SUBCUTANEOUS
Refills: 0 | Status: DISCONTINUED | OUTPATIENT
Start: 2024-05-07 | End: 2024-05-07

## 2024-05-07 RX ORDER — SODIUM CHLORIDE 9 MG/ML
1000 INJECTION, SOLUTION INTRAVENOUS
Refills: 0 | Status: DISCONTINUED | OUTPATIENT
Start: 2024-05-07 | End: 2024-05-08

## 2024-05-07 RX ORDER — BUDESONIDE AND FORMOTEROL FUMARATE DIHYDRATE 160; 4.5 UG/1; UG/1
2 AEROSOL RESPIRATORY (INHALATION)
Refills: 0 | Status: DISCONTINUED | OUTPATIENT
Start: 2024-05-07 | End: 2024-05-09

## 2024-05-07 RX ORDER — LANOLIN ALCOHOL/MO/W.PET/CERES
6 CREAM (GRAM) TOPICAL AT BEDTIME
Refills: 0 | Status: DISCONTINUED | OUTPATIENT
Start: 2024-05-07 | End: 2024-05-09

## 2024-05-07 RX ORDER — INFLUENZA VIRUS VACCINE 15; 15; 15; 15 UG/.5ML; UG/.5ML; UG/.5ML; UG/.5ML
0.5 SUSPENSION INTRAMUSCULAR ONCE
Refills: 0 | Status: DISCONTINUED | OUTPATIENT
Start: 2024-05-07 | End: 2024-05-09

## 2024-05-07 RX ORDER — SODIUM CHLORIDE 9 MG/ML
4 INJECTION INTRAMUSCULAR; INTRAVENOUS; SUBCUTANEOUS EVERY 12 HOURS
Refills: 0 | Status: DISCONTINUED | OUTPATIENT
Start: 2024-05-07 | End: 2024-05-09

## 2024-05-07 RX ORDER — LANOLIN ALCOHOL/MO/W.PET/CERES
5 CREAM (GRAM) TOPICAL AT BEDTIME
Refills: 0 | Status: DISCONTINUED | OUTPATIENT
Start: 2024-05-07 | End: 2024-05-07

## 2024-05-07 RX ORDER — SODIUM CHLORIDE 9 MG/ML
1000 INJECTION, SOLUTION INTRAVENOUS
Refills: 0 | Status: DISCONTINUED | OUTPATIENT
Start: 2024-05-07 | End: 2024-05-07

## 2024-05-07 RX ORDER — ACETAMINOPHEN 500 MG
1000 TABLET ORAL EVERY 6 HOURS
Refills: 0 | Status: COMPLETED | OUTPATIENT
Start: 2024-05-07 | End: 2024-05-09

## 2024-05-07 RX ADMIN — Medication 1 SPRAY(S): at 19:17

## 2024-05-07 RX ADMIN — BUDESONIDE AND FORMOTEROL FUMARATE DIHYDRATE 2 PUFF(S): 160; 4.5 AEROSOL RESPIRATORY (INHALATION) at 08:56

## 2024-05-07 RX ADMIN — BUDESONIDE AND FORMOTEROL FUMARATE DIHYDRATE 2 PUFF(S): 160; 4.5 AEROSOL RESPIRATORY (INHALATION) at 21:12

## 2024-05-07 RX ADMIN — ALBUTEROL 2 PUFF(S): 90 AEROSOL, METERED ORAL at 08:56

## 2024-05-07 RX ADMIN — SODIUM CHLORIDE 4 MILLILITER(S): 9 INJECTION INTRAMUSCULAR; INTRAVENOUS; SUBCUTANEOUS at 22:19

## 2024-05-07 RX ADMIN — Medication 400 MILLIGRAM(S): at 08:55

## 2024-05-07 RX ADMIN — ENOXAPARIN SODIUM 40 MILLIGRAM(S): 100 INJECTION SUBCUTANEOUS at 08:56

## 2024-05-07 RX ADMIN — Medication 15 MILLIGRAM(S): at 04:32

## 2024-05-07 RX ADMIN — Medication 400 MILLIGRAM(S): at 23:31

## 2024-05-07 RX ADMIN — Medication 6 MILLIGRAM(S): at 21:11

## 2024-05-07 RX ADMIN — SODIUM CHLORIDE 100 MILLILITER(S): 9 INJECTION, SOLUTION INTRAVENOUS at 08:55

## 2024-05-07 RX ADMIN — ALBUTEROL 2 PUFF(S): 90 AEROSOL, METERED ORAL at 14:35

## 2024-05-07 NOTE — PATIENT PROFILE ADULT - HAVE YOU RECENTLY LOST WEIGHT WITHOUT TRYING?
No (0) Topical Retinoid counseling:  Patient advised to apply a pea-sized amount only at bedtime and wait 30 minutes after washing their face before applying.  If too drying, patient may add a non-comedogenic moisturizer. The patient verbalized understanding of the proper use and possible adverse effects of retinoids.  All of the patient's questions and concerns were addressed.

## 2024-05-07 NOTE — PATIENT PROFILE ADULT - FALL HARM RISK - RISK INTERVENTIONS
Communicate Fall Risk and Risk Factors to all staff, patient, and family/Reinforce activity limits and safety measures with patient and family/Use of alarms - bed, chair and/or voice tab/Visual Cue: Yellow wristband/Bed in lowest position, wheels locked, appropriate side rails in place/Call bell, personal items and telephone in reach/Instruct patient to call for assistance before getting out of bed or chair/Non-slip footwear when patient is out of bed/[place] to call system/Physically safe environment - no spills, clutter or unnecessary equipment/Purposeful Proactive Rounding/Room/bathroom lighting operational, light cord in reach

## 2024-05-07 NOTE — H&P ADULT - ATTENDING COMMENTS
DATE OF SERVICE: 05-07-24 @ 12:26    48F with hx of COPD, hx of umbilical hernia repair (no mesh) 2013, here with painful ventral hernia. Hernia was noted 1 year ago but worsening pain over last few days. Having nl bowel fx  Labs/vitals reviewed  CT with 3cm hernia neck with incarcerated fat, no bowel in hernia  Abd soft, mild ttp over hernia site, no skin changes    Will plan to admit  Medicine/pulm optimization  Add for OR this admission for robotic VHR

## 2024-05-07 NOTE — ED ADULT NURSE NOTE - NSFALLUNIVINTERV_ED_ALL_ED
Bed/Stretcher in lowest position, wheels locked, appropriate side rails in place/Call bell, personal items and telephone in reach/Instruct patient to call for assistance before getting out of bed/chair/stretcher/Non-slip footwear applied when patient is off stretcher/[place] to call system/Physically safe environment - no spills, clutter or unnecessary equipment/Purposeful proactive rounding/Room/bathroom lighting operational, light cord in reach

## 2024-05-07 NOTE — H&P ADULT - ASSESSMENT
Assessment: 48F with COPD and ventral hernia repaired in 2013, now with recurrence x1 year, presents with pain and incarcerated fat containing hernia.     Plan:  - admit  - npo  - IVF  - add on for OR lap vs robo hernia   - lvx  - home inhalers  - pain control   - d/w Dr. Earline Mcrae MD, PGY3  A Team Surgery   [id number]

## 2024-05-07 NOTE — ED ADULT NURSE NOTE - OBJECTIVE STATEMENT
Pt received uncomfortably, A&Ox4, reports abd pain worst today with nausea, vomiting and hx of abd hernia repair. Denies chest pain, sob, fever, diarrhea. Labs drawn, IV established, and medication administered. Pt pending CT scan.

## 2024-05-07 NOTE — CONSULT NOTE ADULT - SUBJECTIVE AND OBJECTIVE BOX
Patient is a 48-year-old female with Medical history of COPD, abdominal hernia repair presenting with chief complaint of nausea, vomiting.  Of note patient has been having increasing abdominal pain for the past 2 days and no bowel movement for the past 2 days.  Painful flatus.  No history of any other abdominal surgeries.  Denies any fevers, chills, urinary symptoms.  No other complaints at this time. found to have abdominal hermia. plan for OR     Subjective: Patient seen and examined. No new events except as noted.   Doing okay  abdominal pain     REVIEW OF SYSTEMS:    CONSTITUTIONAL: No weakness, fevers or chills  EYES/ENT: No visual changes;  No vertigo or throat pain   NECK: No pain or stiffness  RESPIRATORY: No cough, wheezing, hemoptysis; No shortness of breath  CARDIOVASCULAR: No chest pain or palpitations  GASTROINTESTINAL: Pain on and off   GENITOURINARY: No dysuria, frequency or hematuria  NEUROLOGICAL: No numbness or weakness  SKIN: No itching, burning, rashes, or lesions   All other review of systems is negative unless indicated above.    MEDICATIONS:  MEDICATIONS  (STANDING):  budesonide  80 MICROgram(s)/formoterol 4.5 MICROgram(s) Inhaler 2 Puff(s) Inhalation two times a day  enoxaparin Injectable 40 milliGRAM(s) SubCutaneous every 24 hours  influenza   Vaccine 0.5 milliLiter(s) IntraMuscular once  lactated ringers. 1000 milliLiter(s) (100 mL/Hr) IV Continuous <Continuous>      PHYSICAL EXAM:  T(C): 36.6 (24 @ 14:40), Max: 36.9 (24 @ 20:50)  HR: 65 (24 @ 14:40) (55 - 79)  BP: 119/74 (24 @ 14:40) (112/69 - 137/87)  RR: 18 (24 @ 14:40) (16 - 20)  SpO2: 99% (24 @ 14:40) (96% - 99%)  Wt(kg): --  I&O's Summary    07 May 2024 07:01  -  07 May 2024 15:40  --------------------------------------------------------  IN: 100 mL / OUT: 0 mL / NET: 100 mL      Appearance: Normal	  HEENT:  PERRLA   Lymphatic: No lymphadenopathy   Cardiovascular: Normal S1 S2, no JVD  Respiratory: normal effort , clear  Gastrointestinal:  Soft, pain on palpation   Skin: No rashes,  warm to touch  Psychiatry:  Mood & affect appropriate  Musculoskeletal:  No edema    recent labs, Imaging and EKGs personally reviewed     24 @ 07:01  -  24 @ 15:40  --------------------------------------------------------  IN: 100 mL / OUT: 0 mL / NET: 100 mL                          9.8    9.07  )-----------( 388      ( 06 May 2024 23:55 )             30.8                   138  |  104  |  12  ----------------------------<  103<H>  3.7   |  20<L>  |  0.64    Ca    9.1      06 May 2024 23:55    TPro  6.9  /  Alb  3.9  /  TBili  <0.2  /  DBili  x   /  AST  14  /  ALT  9   /  AlkPhos  67  05-    PT/INR - ( 06 May 2024 23:55 )   PT: 10.1 sec;   INR: <0.90 ratio         PTT - ( 06 May 2024 23:55 )  PTT:28.9 sec                   Urinalysis Basic - ( 07 May 2024 00:51 )    Color: Yellow / Appearance: Clear / S.015 / pH: x  Gluc: x / Ketone: Negative mg/dL  / Bili: Negative / Urobili: 0.2 mg/dL   Blood: x / Protein: Negative mg/dL / Nitrite: Negative   Leuk Esterase: Negative / RBC: 1 /HPF / WBC 1 /HPF   Sq Epi: x / Non Sq Epi: 2 /HPF / Bacteria: Negative /HPF              
HPI:  SURGERY CONSULT NOTE  --------------------------------------------------------------------------------------------    Patient is a 48y old  Female who presents with a chief complaint of abd pain    HPI: 48F with COPD quit smoking 1 month ago, with abd pain x several days. Nauseated, no vomiting. No fevers.  Has ventral hernia fixed in , with recurrence for past year, pain now worsening prompting her to come in.  No obstructive symptoms.  No antiplatelets no anticoagulation.  Last PO this morning.      Pt reports that her breathing is at baseline  No wheezing  No cough/sputum  No SOB   Felt she had a viral syndrome 3 weeks ago    Has copd exacerbations about 1/ year    ROS: denies fever  +nasal congestion  denies wheezing  denies SOB  denies CP  +vomiting      PAST MEDICAL & SURGICAL HISTORY:  Umbilical hernia      Asthma  last attack was one year ago; Never intubated or hospitalized      Gestational diabetes      Anemia      Legal   x2      S/P dilatation and curettage  for missed       S/P hernia repair        FAMILY HISTORY:  Family history of diabetes mellitus (Mother, Aunt)        SOCIAL HISTORY:      ALLERGIES: No Known Allergies      HOME MEDICATIONS:     CURRENT MEDICATIONS  MEDICATIONS (STANDING):   MEDICATIONS (PRN):  --------------------------------------------------------------------------------------------    Vitals:   T(C): 36.8 (24 @ 02:47), Max: 36.9 (24 @ 20:50)  HR: 70 (24 @ 02:47) (55 - 79)  BP: 112/69 (24 @ 02:47) (112/69 - 137/87)  RR: 16 (24 @ 02:47) (16 - 20)  SpO2: 97% (24 @ 02:47) (96% - 98%)  CAPILLARY BLOOD GLUCOSE      PHYSICAL EXAM:   General: uncomfortable, Lying in bed  Neuro: A+Ox3  Cardio: RRR  Resp: Good effort  GI/Abd: Soft, tender supraumbilical ventral hernia with incarceration   Skin: Filemon changes   Musculoskeletal: All 4 extremities moving spontaneously, no limitations.  --------------------------------------------------------------------------------------------    LABS  CBC ( 23:55)                              9.8<L>                         9.07    )----------------(  388        56.8  % Neutrophils, 30.4  % Lymphocytes, ANC: 5.14                                30.8<L>    BMP ( @ 23:55)             138     |  104     |  12    		Ca++ --      Ca 9.1                ---------------------------------( 103<H>		Mg --                 3.7     |  20<L>   |  0.64  			Ph --        LFTs ( 23:55)      TPro 6.9 / Alb 3.9 / TBili <0.2 / DBili -- / AST 14 / ALT 9 / AlkPhos 67    Coags ( @ 23:55)  aPTT 28.9 / INR <0.90 / PT 10.1        VBG ( 23:55)     7.37 / 41 / 50<H> / 24 / -1.5 / 77.6%     Lactate: 1.1    --------------------------------------------------------------------------------------------    MICROBIOLOGY  Urinalysis ( @ 00:51):     Color: Yellow / Appearance: Clear / S.015 / pH: 6.5 / Gluc: Negative / Ketones: Negative / Bili: Negative / Urobili: 0.2 / Protein :Negative / Nitrites: Negative / Leuk.Est: Negative / RBC: 1 / WBC: 1 / Sq Epi:  / Non Sq Epi: 2 / Bacteria Negative        (07 May 2024 05:12)      24 hr events:      ## Labs:  CBC:                        9.8    9.07  )-----------( 388      ( 06 May 2024 23:55 )             30.8     Chem:      138  |  104  |  12  ----------------------------<  103<H>  3.7   |  20<L>  |  0.64    Ca    9.1      06 May 2024 23:55    TPro  6.9  /  Alb  3.9  /  TBili  <0.2  /  DBili  x   /  AST  14  /  ALT  9   /  AlkPhos  67      Coags:  PT/INR - ( 06 May 2024 23:55 )   PT: 10.1 sec;   INR: <0.90 ratio         PTT - ( 06 May 2024 23:55 )  PTT:28.9 sec    Medications:      albuterol    90 MICROgram(s) HFA Inhaler 2 Puff(s) Inhalation every 6 hours PRN  budesonide  80 MICROgram(s)/formoterol 4.5 MICROgram(s) Inhaler 2 Puff(s) Inhalation two times a day      enoxaparin Injectable 40 milliGRAM(s) SubCutaneous every 24 hours      acetaminophen   IVPB .. 1000 milliGRAM(s) IV Intermittent every 6 hours PRN  HYDROmorphone  Injectable 0.25 milliGRAM(s) IV Push every 6 hours PRN      ## Vitals:  T(C): 36.9 (24 @ 13:28), Max: 36.9 (24 @ 20:50)  HR: 74 (24 @ 13:28) (55 - 79)  BP: 123/66 (24 @ 13:28) (112/69 - 137/87)  BP(mean): --  RR: 18 (24 @ 13:28) (16 - 20)  SpO2: 98% (24 @ 13:28) (96% - 98%)        ## P/E:  Gen: lying comfortably in bed in no apparent distress  Mouth: mmm  Neck: no accessory muscle use  Lungs: b/l cta  Heart: s1s2 reg no murmur  Abd: +BS soft nontender  Ext: no edema  Neuro: aox3, buck

## 2024-05-07 NOTE — H&P ADULT - NSHPLABSRESULTS_GEN_ALL_CORE
< from: CT Abdomen and Pelvis w/ IV Cont (05.07.24 @ 01:35) >    CONTRAST/COMPLICATIONS:  IV Contrast:Omnipaque 350  90 cc administered   10 cc discarded  Oral Contrast: NONE  Complications: None reported at time of study completion    PROCEDURE:  CT of the Abdomen and Pelvis was performed.  Sagittal and coronal reformats were performed.    FINDINGS:  LOWER CHEST: Plaque-like atelectasis within the lingula.    LIVER: 1.2 cm indeterminate lesion along the posterior edge of the right   hepatic lobe (series 2 image 34 post (; it shows imaging characteristics   seen with hemangiomas.  BILE DUCTS: Normal caliber.  GALLBLADDER: Within normal limits.  SPLEEN: Within normal limits.  PANCREAS: Within normal limits.  ADRENALS: Within normal limits.  KIDNEYS/URETERS: Indeterminate rounded hypodense lesion in the upper pole   of the left kidney measures 1.9 cm and may represent a   proteinaceous/hemorrhagic cyst; outpatient ultrasound for confirmation.    BLADDER: Within normal limits.  REPRODUCTIVE ORGANS: Uterus and adnexa within normal limits.    BOWEL: No bowel obstruction. Appendix is normal.  PERITONEUM: No ascites.  VESSELS: Atherosclerotic changes. Visualized SMA/SMV are patent.  RETROPERITONEUM/LYMPH NODES: No lymphadenopathy.  ABDOMINAL WALL: Fat containing supraumbilical ventral hernia measuring   5.0 x 4.2 x 4.2 cm with mild internal fat stranding; neck measures up to   2.9 cm..  BONES: Within normal limits.    IMPRESSION:  Fat-containing supraumbilical ventral hernia measuring up to 5.0 cm with   significant inflammatory stranding of the invaginated fat. Incarceration   and strangulation may be considered in the proper clinical scenario.   Surgical evaluation is advised.

## 2024-05-07 NOTE — H&P ADULT - HISTORY OF PRESENT ILLNESS
SURGERY CONSULT NOTE  --------------------------------------------------------------------------------------------    Patient is a 48y old  Female who presents with a chief complaint of abd pain    HPI: 48F with COPD quit smoking 1 month ago, with abd pain x several days. Nauseated, no vomiting. No fevers.  Has ventral hernia fixed in , with recurrence for past year, pain now worsening prompting her to come in.  No obstructive symptoms.  No antiplatelets no anticoagulation.  Last PO this morning.        ROS: 10-system review is otherwise negative except HPI above.      PAST MEDICAL & SURGICAL HISTORY:  Umbilical hernia      Asthma  last attack was one year ago; Never intubated or hospitalized      Gestational diabetes      Anemia      Legal   x2      S/P dilatation and curettage  for missed       S/P hernia repair        FAMILY HISTORY:  Family history of diabetes mellitus (Mother, Aunt)        SOCIAL HISTORY:      ALLERGIES: No Known Allergies      HOME MEDICATIONS:     CURRENT MEDICATIONS  MEDICATIONS (STANDING):   MEDICATIONS (PRN):  --------------------------------------------------------------------------------------------    Vitals:   T(C): 36.8 (24 @ 02:47), Max: 36.9 (24 @ 20:50)  HR: 70 (24 @ 02:47) (55 - 79)  BP: 112/69 (24 @ 02:47) (112/69 - 137/87)  RR: 16 (24 @ 02:47) (16 - 20)  SpO2: 97% (24 @ 02:47) (96% - 98%)  CAPILLARY BLOOD GLUCOSE      PHYSICAL EXAM:   General: uncomfortable, Lying in bed  Neuro: A+Ox3  Cardio: RRR  Resp: Good effort  GI/Abd: Soft, tender supraumbilical ventral hernia with incarceration   Skin: Filemon changes   Musculoskeletal: All 4 extremities moving spontaneously, no limitations.  --------------------------------------------------------------------------------------------    LABS  CBC ( @ 23:55)                              9.8<L>                         9.07    )----------------(  388        56.8  % Neutrophils, 30.4  % Lymphocytes, ANC: 5.14                                30.8<L>    BMP ( @ 23:55)             138     |  104     |  12    		Ca++ --      Ca 9.1                ---------------------------------( 103<H>		Mg --                 3.7     |  20<L>   |  0.64  			Ph --        LFTs ( @ 23:55)      TPro 6.9 / Alb 3.9 / TBili <0.2 / DBili -- / AST 14 / ALT 9 / AlkPhos 67    Coags ( @ 23:55)  aPTT 28.9 / INR <0.90 / PT 10.1        VBG ( @ 23:55)     7.37 / 41 / 50<H> / 24 / -1.5 / 77.6%     Lactate: 1.1    --------------------------------------------------------------------------------------------    MICROBIOLOGY  Urinalysis ( @ 00:51):     Color: Yellow / Appearance: Clear / S.015 / pH: 6.5 / Gluc: Negative / Ketones: Negative / Bili: Negative / Urobili: 0.2 / Protein :Negative / Nitrites: Negative / Leuk.Est: Negative / RBC: 1 / WBC: 1 / Sq Epi:  / Non Sq Epi: 2 / Bacteria Negative

## 2024-05-07 NOTE — CONSULT NOTE ADULT - ASSESSMENT
Patient is a 48-year-old female with Medical history of COPD, abdominal hernia repair presenting with chief complaint of nausea, vomiting.  Of note patient has been having increasing abdominal pain for the past 2 days and no bowel movement for the past 2 days.  Painful flatus.  No history of any other abdominal surgeries.  Denies any fevers, chills, urinary symptoms.  No other complaints at this time. found to have abdominal hermia. plan for OR       # Incarcerated fat containing hernia  Plan for OR  surg care appreciated   Labs noted  EKG noted   no hx of heart disease, no CP or FIERRO  patient is medically optimized for OR     # COPD/Asthma  O2 sat stable asymptomatic  pulm eval per surg   nebs and inhalers  CPAP at night     # Anemia   check anemia W/U  CT abd/pelv noted   Keep hgb above 7       DVT and gI PPX 
48 F hx COPD p/w incarcerated hernia.  COPD seems to be at baseline and denies complaints concerning for acute exacerbation  Does have also a hx of LOGAN but not yet on cpap      - cont symbicort bid while inpt   - cont BD tx prn  - maintain sat >90%  - Incentive fransisco  - hx of LOGAN but not yet on tx- cautious use of opiods and sedatives and monitor closely post op   - dvt ppx

## 2024-05-07 NOTE — ED ADULT NURSE REASSESSMENT NOTE - NS ED NURSE REASSESS COMMENT FT1
Patient received from intake to results waiting. Patient is Aox4, in no signs of acute distress, resting comfortably on stretcher. Awaiting CT. Comfort measures maintained. Safety Maintained.

## 2024-05-08 ENCOUNTER — TRANSCRIPTION ENCOUNTER (OUTPATIENT)
Age: 49
End: 2024-05-08

## 2024-05-08 LAB
ALBUMIN SERPL ELPH-MCNC: 3.4 G/DL — SIGNIFICANT CHANGE UP (ref 3.3–5)
ALP SERPL-CCNC: 55 U/L — SIGNIFICANT CHANGE UP (ref 40–120)
ALT FLD-CCNC: 8 U/L — SIGNIFICANT CHANGE UP (ref 4–33)
ANION GAP SERPL CALC-SCNC: 13 MMOL/L — SIGNIFICANT CHANGE UP (ref 7–14)
AST SERPL-CCNC: 13 U/L — SIGNIFICANT CHANGE UP (ref 4–32)
BILIRUB SERPL-MCNC: 0.2 MG/DL — SIGNIFICANT CHANGE UP (ref 0.2–1.2)
BUN SERPL-MCNC: 6 MG/DL — LOW (ref 7–23)
CALCIUM SERPL-MCNC: 8.1 MG/DL — LOW (ref 8.4–10.5)
CHLORIDE SERPL-SCNC: 107 MMOL/L — SIGNIFICANT CHANGE UP (ref 98–107)
CO2 SERPL-SCNC: 22 MMOL/L — SIGNIFICANT CHANGE UP (ref 22–31)
CREAT SERPL-MCNC: 0.67 MG/DL — SIGNIFICANT CHANGE UP (ref 0.5–1.3)
CULTURE RESULTS: SIGNIFICANT CHANGE UP
EGFR: 108 ML/MIN/1.73M2 — SIGNIFICANT CHANGE UP
FERRITIN SERPL-MCNC: 29 NG/ML — SIGNIFICANT CHANGE UP (ref 15–150)
FOLATE SERPL-MCNC: >20 NG/ML — HIGH (ref 3.1–17.5)
GLUCOSE SERPL-MCNC: 88 MG/DL — SIGNIFICANT CHANGE UP (ref 70–99)
HCT VFR BLD CALC: 28.6 % — LOW (ref 34.5–45)
HGB BLD-MCNC: 8.8 G/DL — LOW (ref 11.5–15.5)
IRON SATN MFR SERPL: 10 % — LOW (ref 14–50)
IRON SATN MFR SERPL: 35 UG/DL — SIGNIFICANT CHANGE UP (ref 30–160)
MAGNESIUM SERPL-MCNC: 1.8 MG/DL — SIGNIFICANT CHANGE UP (ref 1.6–2.6)
MCHC RBC-ENTMCNC: 25.8 PG — LOW (ref 27–34)
MCHC RBC-ENTMCNC: 30.8 GM/DL — LOW (ref 32–36)
MCV RBC AUTO: 83.9 FL — SIGNIFICANT CHANGE UP (ref 80–100)
NRBC # BLD: 0 /100 WBCS — SIGNIFICANT CHANGE UP (ref 0–0)
NRBC # FLD: 0 K/UL — SIGNIFICANT CHANGE UP (ref 0–0)
PHOSPHATE SERPL-MCNC: 4.3 MG/DL — SIGNIFICANT CHANGE UP (ref 2.5–4.5)
PLATELET # BLD AUTO: 329 K/UL — SIGNIFICANT CHANGE UP (ref 150–400)
POTASSIUM SERPL-MCNC: 4.1 MMOL/L — SIGNIFICANT CHANGE UP (ref 3.5–5.3)
POTASSIUM SERPL-SCNC: 4.1 MMOL/L — SIGNIFICANT CHANGE UP (ref 3.5–5.3)
PROT SERPL-MCNC: 5.8 G/DL — LOW (ref 6–8.3)
RBC # BLD: 3.41 M/UL — LOW (ref 3.8–5.2)
RBC # FLD: 15.9 % — HIGH (ref 10.3–14.5)
SODIUM SERPL-SCNC: 142 MMOL/L — SIGNIFICANT CHANGE UP (ref 135–145)
SPECIMEN SOURCE: SIGNIFICANT CHANGE UP
TIBC SERPL-MCNC: 339 UG/DL — SIGNIFICANT CHANGE UP (ref 220–430)
UIBC SERPL-MCNC: 304 UG/DL — SIGNIFICANT CHANGE UP (ref 110–370)
VIT B12 SERPL-MCNC: 651 PG/ML — SIGNIFICANT CHANGE UP (ref 200–900)
WBC # BLD: 5.7 K/UL — SIGNIFICANT CHANGE UP (ref 3.8–10.5)
WBC # FLD AUTO: 5.7 K/UL — SIGNIFICANT CHANGE UP (ref 3.8–10.5)

## 2024-05-08 RX ORDER — DEXTROSE MONOHYDRATE, SODIUM CHLORIDE, AND POTASSIUM CHLORIDE 50; .745; 4.5 G/1000ML; G/1000ML; G/1000ML
1000 INJECTION, SOLUTION INTRAVENOUS
Refills: 0 | Status: DISCONTINUED | OUTPATIENT
Start: 2024-05-08 | End: 2024-05-09

## 2024-05-08 RX ORDER — ENOXAPARIN SODIUM 100 MG/ML
40 INJECTION SUBCUTANEOUS EVERY 24 HOURS
Refills: 0 | Status: DISCONTINUED | OUTPATIENT
Start: 2024-05-09 | End: 2024-05-09

## 2024-05-08 RX ORDER — OXYCODONE HYDROCHLORIDE 5 MG/1
5 TABLET ORAL EVERY 6 HOURS
Refills: 0 | Status: DISCONTINUED | OUTPATIENT
Start: 2024-05-08 | End: 2024-05-09

## 2024-05-08 RX ORDER — HEPARIN SODIUM 5000 [USP'U]/ML
5000 INJECTION INTRAVENOUS; SUBCUTANEOUS EVERY 8 HOURS
Refills: 0 | Status: DISCONTINUED | OUTPATIENT
Start: 2024-05-08 | End: 2024-05-08

## 2024-05-08 RX ORDER — DEXTROSE MONOHYDRATE, SODIUM CHLORIDE, AND POTASSIUM CHLORIDE 50; .745; 4.5 G/1000ML; G/1000ML; G/1000ML
1000 INJECTION, SOLUTION INTRAVENOUS
Refills: 0 | Status: DISCONTINUED | OUTPATIENT
Start: 2024-05-08 | End: 2024-05-08

## 2024-05-08 RX ADMIN — Medication 1 SPRAY(S): at 18:43

## 2024-05-08 RX ADMIN — Medication 1 SPRAY(S): at 05:15

## 2024-05-08 RX ADMIN — HYDROMORPHONE HYDROCHLORIDE 0.25 MILLIGRAM(S): 2 INJECTION INTRAMUSCULAR; INTRAVENOUS; SUBCUTANEOUS at 04:20

## 2024-05-08 RX ADMIN — SODIUM CHLORIDE 4 MILLILITER(S): 9 INJECTION INTRAMUSCULAR; INTRAVENOUS; SUBCUTANEOUS at 21:09

## 2024-05-08 RX ADMIN — Medication 400 MILLIGRAM(S): at 19:42

## 2024-05-08 RX ADMIN — Medication 1000 MILLIGRAM(S): at 00:01

## 2024-05-08 RX ADMIN — ENOXAPARIN SODIUM 40 MILLIGRAM(S): 100 INJECTION SUBCUTANEOUS at 09:25

## 2024-05-08 RX ADMIN — SODIUM CHLORIDE 4 MILLILITER(S): 9 INJECTION INTRAMUSCULAR; INTRAVENOUS; SUBCUTANEOUS at 11:37

## 2024-05-08 RX ADMIN — Medication 6 MILLIGRAM(S): at 21:22

## 2024-05-08 RX ADMIN — BUDESONIDE AND FORMOTEROL FUMARATE DIHYDRATE 2 PUFF(S): 160; 4.5 AEROSOL RESPIRATORY (INHALATION) at 21:23

## 2024-05-08 RX ADMIN — HYDROMORPHONE HYDROCHLORIDE 0.25 MILLIGRAM(S): 2 INJECTION INTRAMUSCULAR; INTRAVENOUS; SUBCUTANEOUS at 03:50

## 2024-05-08 NOTE — PROGRESS NOTE ADULT - ASSESSMENT
Assessment: 48F with COPD and ventral hernia repaired in 2013, now with recurrence x1 year, presents with pain and incarcerated fat containing hernia.     Plan:    - npo  - IVF  - add on for OR lap vs robo hernia   - lvx  - home inhalers  - pain control       A Team Surgery   [id number] Assessment: 48F with COPD and ventral hernia repaired in 2013, now with recurrence x1 year, presents with pain and incarcerated fat containing hernia.     Plan:    - npo  - mIVF  - OR lap vs robo hernia tomorrow   - lvx  - home inhalers  - pain control       A Team Surgery   [id number]

## 2024-05-08 NOTE — PROGRESS NOTE ADULT - ASSESSMENT
Patient is a 48-year-old female with Medical history of COPD, abdominal hernia repair presenting with chief complaint of nausea, vomiting.  Of note patient has been having increasing abdominal pain for the past 2 days and no bowel movement for the past 2 days.  Painful flatus.  No history of any other abdominal surgeries.  Denies any fevers, chills, urinary symptoms.  No other complaints at this time. found to have abdominal hermia. plan for OR       # Incarcerated fat containing hernia  Plan for OR  surg care appreciated   Labs noted  EKG noted   no hx of heart disease, no CP or FIERRO  patient is medically optimized for OR     # COPD/Asthma  O2 sat stable asymptomatic  pulm eval per surg , appreciated recs   nebs and inhalers  CPAP at night     # Anemia   check anemia W/U  CT abd/pelv noted   Keep hgb above 7       DVT and gI PPX

## 2024-05-09 ENCOUNTER — TRANSCRIPTION ENCOUNTER (OUTPATIENT)
Age: 49
End: 2024-05-09

## 2024-05-09 VITALS
TEMPERATURE: 98 F | HEART RATE: 71 BPM | RESPIRATION RATE: 17 BRPM | SYSTOLIC BLOOD PRESSURE: 108 MMHG | DIASTOLIC BLOOD PRESSURE: 62 MMHG | OXYGEN SATURATION: 95 %

## 2024-05-09 LAB
ANION GAP SERPL CALC-SCNC: 11 MMOL/L — SIGNIFICANT CHANGE UP (ref 7–14)
APTT BLD: 28.1 SEC — SIGNIFICANT CHANGE UP (ref 24.5–35.6)
BLD GP AB SCN SERPL QL: NEGATIVE — SIGNIFICANT CHANGE UP
BUN SERPL-MCNC: 11 MG/DL — SIGNIFICANT CHANGE UP (ref 7–23)
CALCIUM SERPL-MCNC: 8.3 MG/DL — LOW (ref 8.4–10.5)
CHLORIDE SERPL-SCNC: 105 MMOL/L — SIGNIFICANT CHANGE UP (ref 98–107)
CO2 SERPL-SCNC: 21 MMOL/L — LOW (ref 22–31)
CREAT SERPL-MCNC: 0.66 MG/DL — SIGNIFICANT CHANGE UP (ref 0.5–1.3)
EGFR: 108 ML/MIN/1.73M2 — SIGNIFICANT CHANGE UP
GLUCOSE SERPL-MCNC: 109 MG/DL — HIGH (ref 70–99)
HCG SERPL-ACNC: <1 MIU/ML — SIGNIFICANT CHANGE UP
HCT VFR BLD CALC: 29.1 % — LOW (ref 34.5–45)
HGB BLD-MCNC: 9.4 G/DL — LOW (ref 11.5–15.5)
INR BLD: 0.98 RATIO — SIGNIFICANT CHANGE UP (ref 0.85–1.18)
MAGNESIUM SERPL-MCNC: 1.9 MG/DL — SIGNIFICANT CHANGE UP (ref 1.6–2.6)
MCHC RBC-ENTMCNC: 26.7 PG — LOW (ref 27–34)
MCHC RBC-ENTMCNC: 32.3 GM/DL — SIGNIFICANT CHANGE UP (ref 32–36)
MCV RBC AUTO: 82.7 FL — SIGNIFICANT CHANGE UP (ref 80–100)
NRBC # BLD: 0 /100 WBCS — SIGNIFICANT CHANGE UP (ref 0–0)
NRBC # FLD: 0 K/UL — SIGNIFICANT CHANGE UP (ref 0–0)
PHOSPHATE SERPL-MCNC: 4.1 MG/DL — SIGNIFICANT CHANGE UP (ref 2.5–4.5)
PLATELET # BLD AUTO: 317 K/UL — SIGNIFICANT CHANGE UP (ref 150–400)
POTASSIUM SERPL-MCNC: 4.1 MMOL/L — SIGNIFICANT CHANGE UP (ref 3.5–5.3)
POTASSIUM SERPL-SCNC: 4.1 MMOL/L — SIGNIFICANT CHANGE UP (ref 3.5–5.3)
PROTHROM AB SERPL-ACNC: 11 SEC — SIGNIFICANT CHANGE UP (ref 9.5–13)
RBC # BLD: 3.52 M/UL — LOW (ref 3.8–5.2)
RBC # FLD: 15.2 % — HIGH (ref 10.3–14.5)
RH IG SCN BLD-IMP: POSITIVE — SIGNIFICANT CHANGE UP
SODIUM SERPL-SCNC: 137 MMOL/L — SIGNIFICANT CHANGE UP (ref 135–145)
WBC # BLD: 6.45 K/UL — SIGNIFICANT CHANGE UP (ref 3.8–10.5)
WBC # FLD AUTO: 6.45 K/UL — SIGNIFICANT CHANGE UP (ref 3.8–10.5)

## 2024-05-09 DEVICE — IMPLANTABLE DEVICE: Type: IMPLANTABLE DEVICE | Status: FUNCTIONAL

## 2024-05-09 RX ORDER — OXYCODONE HYDROCHLORIDE 5 MG/1
5 TABLET ORAL EVERY 4 HOURS
Refills: 0 | Status: DISCONTINUED | OUTPATIENT
Start: 2024-05-09 | End: 2024-05-09

## 2024-05-09 RX ORDER — OXYCODONE HYDROCHLORIDE 5 MG/1
10 TABLET ORAL EVERY 4 HOURS
Refills: 0 | Status: DISCONTINUED | OUTPATIENT
Start: 2024-05-09 | End: 2024-05-09

## 2024-05-09 RX ORDER — OXYCODONE HYDROCHLORIDE 5 MG/1
5 TABLET ORAL ONCE
Refills: 0 | Status: DISCONTINUED | OUTPATIENT
Start: 2024-05-09 | End: 2024-05-09

## 2024-05-09 RX ORDER — OXYCODONE HYDROCHLORIDE 5 MG/1
1 TABLET ORAL
Qty: 15 | Refills: 0
Start: 2024-05-09

## 2024-05-09 RX ORDER — ACETAMINOPHEN 500 MG
975 TABLET ORAL EVERY 6 HOURS
Refills: 0 | Status: DISCONTINUED | OUTPATIENT
Start: 2024-05-09 | End: 2024-05-09

## 2024-05-09 RX ORDER — CYCLOBENZAPRINE HYDROCHLORIDE 10 MG/1
1 TABLET, FILM COATED ORAL
Qty: 15 | Refills: 0
Start: 2024-05-09 | End: 2024-05-13

## 2024-05-09 RX ORDER — HYDROMORPHONE HYDROCHLORIDE 2 MG/ML
0.5 INJECTION INTRAMUSCULAR; INTRAVENOUS; SUBCUTANEOUS
Refills: 0 | Status: DISCONTINUED | OUTPATIENT
Start: 2024-05-09 | End: 2024-05-09

## 2024-05-09 RX ORDER — ONDANSETRON 8 MG/1
4 TABLET, FILM COATED ORAL ONCE
Refills: 0 | Status: DISCONTINUED | OUTPATIENT
Start: 2024-05-09 | End: 2024-05-09

## 2024-05-09 RX ORDER — KETOROLAC TROMETHAMINE 30 MG/ML
15 SYRINGE (ML) INJECTION EVERY 6 HOURS
Refills: 0 | Status: DISCONTINUED | OUTPATIENT
Start: 2024-05-09 | End: 2024-05-09

## 2024-05-09 RX ADMIN — OXYCODONE HYDROCHLORIDE 5 MILLIGRAM(S): 5 TABLET ORAL at 17:22

## 2024-05-09 RX ADMIN — Medication 975 MILLIGRAM(S): at 18:19

## 2024-05-09 RX ADMIN — Medication 1 SPRAY(S): at 18:19

## 2024-05-09 RX ADMIN — Medication 15 MILLIGRAM(S): at 18:19

## 2024-05-09 RX ADMIN — Medication 1 SPRAY(S): at 05:14

## 2024-05-09 RX ADMIN — Medication 1000 MILLIGRAM(S): at 03:03

## 2024-05-09 RX ADMIN — DEXTROSE MONOHYDRATE, SODIUM CHLORIDE, AND POTASSIUM CHLORIDE 100 MILLILITER(S): 50; .745; 4.5 INJECTION, SOLUTION INTRAVENOUS at 00:20

## 2024-05-09 RX ADMIN — Medication 400 MILLIGRAM(S): at 02:32

## 2024-05-09 RX ADMIN — SODIUM CHLORIDE 4 MILLILITER(S): 9 INJECTION INTRAMUSCULAR; INTRAVENOUS; SUBCUTANEOUS at 10:38

## 2024-05-09 RX ADMIN — BUDESONIDE AND FORMOTEROL FUMARATE DIHYDRATE 2 PUFF(S): 160; 4.5 AEROSOL RESPIRATORY (INHALATION) at 09:20

## 2024-05-09 NOTE — BRIEF OPERATIVE NOTE - OPERATION/FINDINGS
Robotic assisted JOSEY repair of recurrent ventral hernia using Ventralight mesh. Multiple defects, largest approx 3cm in diameter.

## 2024-05-09 NOTE — DISCHARGE NOTE NURSING/CASE MANAGEMENT/SOCIAL WORK - NSTOBACCONEVERSMOKERY/N_GEN_A
Sure this was sent in electonically.  Please let her know to see dr. Maria Luisa Simms as we discussed too if possible Yes

## 2024-05-09 NOTE — DISCHARGE NOTE PROVIDER - NSDCCPTREATMENT_GEN_ALL_CORE_FT
PRINCIPAL PROCEDURE  Procedure: Reconstruction, abdominal wall, robot-assisted, using mesh  Findings and Treatment:

## 2024-05-09 NOTE — PROGRESS NOTE ADULT - SUBJECTIVE AND OBJECTIVE BOX
Name of Patient : NOREEN MONTOYA  MRN: [id number]        Subjective: Patient seen and examined. No new events except as noted.     REVIEW OF SYSTEMS:    CONSTITUTIONAL: No weakness, fevers or chills  EYES/ENT: No visual changes;  No vertigo or throat pain   NECK: No pain or stiffness  RESPIRATORY: No cough, wheezing, hemoptysis; No shortness of breath  CARDIOVASCULAR: No chest pain or palpitations  GASTROINTESTINAL: No abdominal or epigastric pain. No nausea, vomiting, or hematemesis; No diarrhea or constipation. No melena or hematochezia.  GENITOURINARY: No dysuria, frequency or hematuria  NEUROLOGICAL: No numbness or weakness  SKIN: No itching, burning, rashes, or lesions   All other review of systems is negative unless indicated above.    MEDICATIONS:  MEDICATIONS  (STANDING):  acetaminophen     Tablet .. 975 milliGRAM(s) Oral every 6 hours  budesonide  80 MICROgram(s)/formoterol 4.5 MICROgram(s) Inhaler 2 Puff(s) Inhalation two times a day  enoxaparin Injectable 40 milliGRAM(s) SubCutaneous every 24 hours  fluticasone propionate 50 MICROgram(s)/spray Nasal Spray 1 Spray(s) Both Nostrils two times a day  influenza   Vaccine 0.5 milliLiter(s) IntraMuscular once  ketorolac   Injectable 15 milliGRAM(s) IV Push every 6 hours  melatonin 6 milliGRAM(s) Oral at bedtime  ondansetron    Tablet 4 milliGRAM(s) Oral once  sodium chloride 3%  Inhalation 4 milliLiter(s) Inhalation every 12 hours      PHYSICAL EXAM:  T(C): 36.5 (05-09-24 @ 19:47), Max: 37.1 (05-09-24 @ 01:54)  HR: 71 (05-09-24 @ 19:47) (60 - 87)  BP: 108/62 (05-09-24 @ 19:47) (105/68 - 140/89)  RR: 17 (05-09-24 @ 19:47) (12 - 19)  SpO2: 95% (05-09-24 @ 19:47) (93% - 99%)  Wt(kg): --  I&O's Summary    08 May 2024 07:01  -  09 May 2024 07:00  --------------------------------------------------------  IN: 840 mL / OUT: 0 mL / NET: 840 mL      Height (cm): 158.8 (05-09 @ 12:14)  Weight (kg): 69.8 (05-09 @ 12:14)  BMI (kg/m2): 27.7 (05-09 @ 12:14)  BSA (m2): 1.72 (05-09 @ 12:14)    Appearance: Normal	  HEENT:  PERRLA   Lymphatic: No lymphadenopathy   Cardiovascular: Normal S1 S2, no JVD  Respiratory: normal effort , clear  Gastrointestinal:  Soft, Non-tender  Skin: No rashes,  warm to touch  Psychiatry:  Mood & affect appropriate  Musculuskeletal: No edema    recent labs, Imaging and EKGs personally reviewed     05-08-24 @ 07:01  -  05-09-24 @ 07:00  --------------------------------------------------------  IN: 840 mL / OUT: 0 mL / NET: 840 mL                          9.4    6.45  )-----------( 317      ( 09 May 2024 03:11 )             29.1               05-09    137  |  105  |  11  ----------------------------<  109<H>  4.1   |  21<L>  |  0.66    Ca    8.3<L>      09 May 2024 03:11  Phos  4.1     05-09  Mg     1.90     05-09    TPro  5.8<L>  /  Alb  3.4  /  TBili  0.2  /  DBili  x   /  AST  13  /  ALT  8   /  AlkPhos  55  05-08    PT/INR - ( 09 May 2024 03:11 )   PT: 11.0 sec;   INR: 0.98 ratio         PTT - ( 09 May 2024 03:11 )  PTT:28.1 sec                   Urinalysis Basic - ( 09 May 2024 03:11 )    Color: x / Appearance: x / SG: x / pH: x  Gluc: 109 mg/dL / Ketone: x  / Bili: x / Urobili: x   Blood: x / Protein: x / Nitrite: x   Leuk Esterase: x / RBC: x / WBC x   Sq Epi: x / Non Sq Epi: x / Bacteria: x              
Surgery Progress Note    OVERNIGHT EVENTS: NAEO    SUBJECTIVE: Pt seen and examined at bedside. Patient comfortable and in no-apparent distress.  Vital Signs Last 24 Hrs  T(C): 37.1 (09 May 2024 01:54), Max: 37.3 (08 May 2024 22:25)  T(F): 98.8 (09 May 2024 01:54), Max: 99.1 (08 May 2024 22:25)  HR: 69 (09 May 2024 01:54) (58 - 81)  BP: 132/78 (09 May 2024 01:54) (106/58 - 132/78)  BP(mean): --  RR: 18 (09 May 2024 01:54) (16 - 18)  SpO2: 99% (09 May 2024 01:54) (96% - 99%)    Parameters below as of 09 May 2024 01:54  Patient On (Oxygen Delivery Method): room air        PHYSICAL EXAM:  General Appearance: Appears well, NAD  Respiratory: No labored breathing  CV: Pulse regularly present  Abdomen: Soft, nontender,     INs and OUTs:    05-07-24 @ 07:01  -  05-08-24 @ 07:00  --------------------------------------------------------  IN: 1520 mL / OUT: 0 mL / NET: 1520 mL    05-08-24 @ 07:01  -  05-09-24 @ 06:03  --------------------------------------------------------  IN: 440 mL / OUT: 0 mL / NET: 440 mL        LABS:                        9.4    6.45  )-----------( 317      ( 09 May 2024 03:11 )             29.1     05-09    137  |  105  |  11  ----------------------------<  109<H>  4.1   |  21<L>  |  0.66    Ca    8.3<L>      09 May 2024 03:11  Phos  4.1     05-09  Mg     1.90     05-09    TPro  5.8<L>  /  Alb  3.4  /  TBili  0.2  /  DBili  x   /  AST  13  /  ALT  8   /  AlkPhos  55  05-08    PT/INR - ( 09 May 2024 03:11 )   PT: 11.0 sec;   INR: 0.98 ratio         PTT - ( 09 May 2024 03:11 )  PTT:28.1 sec  Urinalysis Basic - ( 09 May 2024 03:11 )    Color: x / Appearance: x / SG: x / pH: x  Gluc: 109 mg/dL / Ketone: x  / Bili: x / Urobili: x   Blood: x / Protein: x / Nitrite: x   Leuk Esterase: x / RBC: x / WBC x   Sq Epi: x / Non Sq Epi: x / Bacteria: x        
Name of Patient : NOREEN MONTOYA  MRN: [id number]  Date of visit: 05-08-24       Subjective: Patient seen and examined. No new events except as noted.   Doing okay       REVIEW OF SYSTEMS:    CONSTITUTIONAL: No weakness, fevers or chills  EYES/ENT: No visual changes;  No vertigo or throat pain   NECK: No pain or stiffness  RESPIRATORY: No cough, wheezing, hemoptysis; No shortness of breath  CARDIOVASCULAR: No chest pain or palpitations  GASTROINTESTINAL: No abdominal or epigastric pain. No nausea, vomiting, or hematemesis; No diarrhea or constipation. No melena or hematochezia.  GENITOURINARY: No dysuria, frequency or hematuria  NEUROLOGICAL: No numbness or weakness  SKIN: No itching, burning, rashes, or lesions   All other review of systems is negative unless indicated above.    MEDICATIONS:  MEDICATIONS  (STANDING):  budesonide  80 MICROgram(s)/formoterol 4.5 MICROgram(s) Inhaler 2 Puff(s) Inhalation two times a day  dextrose 5% + sodium chloride 0.45% with potassium chloride 20 mEq/L 1000 milliLiter(s) (100 mL/Hr) IV Continuous <Continuous>  fluticasone propionate 50 MICROgram(s)/spray Nasal Spray 1 Spray(s) Both Nostrils two times a day  influenza   Vaccine 0.5 milliLiter(s) IntraMuscular once  melatonin 6 milliGRAM(s) Oral at bedtime  sodium chloride 3%  Inhalation 4 milliLiter(s) Inhalation every 12 hours      PHYSICAL EXAM:  T(C): 36.8 (05-08-24 @ 17:59), Max: 37 (05-08-24 @ 09:51)  HR: 58 (05-08-24 @ 17:59) (58 - 88)  BP: 113/62 (05-08-24 @ 17:59) (106/58 - 143/98)  RR: 18 (05-08-24 @ 17:59) (16 - 18)  SpO2: 96% (05-08-24 @ 17:59) (96% - 100%)  Wt(kg): --  I&O's Summary    07 May 2024 07:01  -  08 May 2024 07:00  --------------------------------------------------------  IN: 1520 mL / OUT: 0 mL / NET: 1520 mL          Appearance: Normal	  HEENT:  PERRLA   Lymphatic: No lymphadenopathy   Cardiovascular: Normal S1 S2, no JVD  Respiratory: normal effort , clear  Gastrointestinal:  Soft, Non-tender  Skin: No rashes,  warm to touch  Psychiatry:  Mood & affect appropriate  Musculuskeletal: No edema    recent labs, Imaging and EKGs personally reviewed     05-07-24 @ 07:01  -  05-08-24 @ 07:00  --------------------------------------------------------  IN: 1520 mL / OUT: 0 mL / NET: 1520 mL                          8.8    5.70  )-----------( 329      ( 08 May 2024 05:50 )             28.6               05-08    142  |  107  |  6<L>  ----------------------------<  88  4.1   |  22  |  0.67    Ca    8.1<L>      08 May 2024 05:50  Phos  4.3     05-08  Mg     1.80     05-08    TPro  5.8<L>  /  Alb  3.4  /  TBili  0.2  /  DBili  x   /  AST  13  /  ALT  8   /  AlkPhos  55  05-08    PT/INR - ( 06 May 2024 23:55 )   PT: 10.1 sec;   INR: <0.90 ratio         PTT - ( 06 May 2024 23:55 )  PTT:28.9 sec                   Urinalysis Basic - ( 08 May 2024 05:50 )    Color: x / Appearance: x / SG: x / pH: x  Gluc: 88 mg/dL / Ketone: x  / Bili: x / Urobili: x   Blood: x / Protein: x / Nitrite: x   Leuk Esterase: x / RBC: x / WBC x   Sq Epi: x / Non Sq Epi: x / Bacteria: x              
Surgery Progress Note    OVERNIGHT EVENTS: NAEO    SUBJECTIVE: Pt seen and examined at bedside. Patient comfortable and in no-apparent distress. Pain is controlled.     Vital Signs Last 24 Hrs  T(C): 36.8 (08 May 2024 05:49), Max: 36.9 (07 May 2024 13:28)  T(F): 98.3 (08 May 2024 05:49), Max: 98.4 (07 May 2024 13:28)  HR: 62 (08 May 2024 05:49) (57 - 88)  BP: 143/98 (08 May 2024 05:49) (111/60 - 143/98)  BP(mean): --  RR: 16 (08 May 2024 05:49) (16 - 18)  SpO2: 97% (08 May 2024 05:49) (97% - 100%)    Parameters below as of 08 May 2024 05:49  Patient On (Oxygen Delivery Method): room air        PHYSICAL EXAM:  General Appearance: Appears well, NAD  Respiratory: No labored breathing  CV: Pulse regularly present  Abdomen: Soft, nontender    INs and OUTs:    24 @ 07:01  -  24 @ 06:07  --------------------------------------------------------  IN: 1520 mL / OUT: 0 mL / NET: 1520 mL        LABS:                        9.8    9.07  )-----------( 388      ( 06 May 2024 23:55 )             30.8         138  |  104  |  12  ----------------------------<  103<H>  3.7   |  20<L>  |  0.64    Ca    9.1      06 May 2024 23:55    TPro  6.9  /  Alb  3.9  /  TBili  <0.2  /  DBili  x   /  AST  14  /  ALT  9   /  AlkPhos  67      PT/INR - ( 06 May 2024 23:55 )   PT: 10.1 sec;   INR: <0.90 ratio         PTT - ( 06 May 2024 23:55 )  PTT:28.9 sec  Urinalysis Basic - ( 07 May 2024 00:51 )    Color: Yellow / Appearance: Clear / S.015 / pH: x  Gluc: x / Ketone: Negative mg/dL  / Bili: Negative / Urobili: 0.2 mg/dL   Blood: x / Protein: Negative mg/dL / Nitrite: Negative   Leuk Esterase: Negative / RBC: 1 /HPF / WBC 1 /HPF   Sq Epi: x / Non Sq Epi: 2 /HPF / Bacteria: Negative /HPF

## 2024-05-09 NOTE — PROGRESS NOTE ADULT - ATTENDING COMMENTS
DATE OF SERVICE: 05-09-24 @ 13:07    For OR today for robotic VHR  R/B/A explained, agrees to proceed
DATE OF SERVICE: 05-08-24 @ 14:57    Still having some pain, feeling well otherwise.  Labs WNL  Abd soft with mild ttp over hernia site without skin changes    Cleared by med/pulm  OR tomorrow for srini FARAH

## 2024-05-09 NOTE — DISCHARGE NOTE NURSING/CASE MANAGEMENT/SOCIAL WORK - NSDCPEFALRISK_GEN_ALL_CORE
For information on Fall & Injury Prevention, visit: [email or web address] OR  [email or web address] OR  https://www.cdc.gov/steadi/patient.html

## 2024-05-09 NOTE — DISCHARGE NOTE NURSING/CASE MANAGEMENT/SOCIAL WORK - PATIENT PORTAL LINK FT
You can access the FollowMyHealth Patient Portal offered by [facility] by registering at the following website: [email or web address]. By joining [employer]’s FollowMyHealth portal, you will also be able to view your health information using other applications (apps) compatible with our system.

## 2024-05-09 NOTE — DISCHARGE NOTE PROVIDER - HOSPITAL COURSE
48F with h/o COPD (not on home O2), HTN, prior primary umbilical hernia repair (2013, Dr. Wagoner) who presented with abdominal pain. Patient was found to have a supraumbilical fat-containing ventral hernia with incarcerated fat. She was admitted for pain control and operative repair. Medicine and pulmonology were consulted who deemed patient optimized for surgery. On 5/9/2024, patient underwent a robotic ventral hernia repair (JOSEY, ventralight mesh). On day of discharge, her pain was controlled and she was tolerating a regular diet.

## 2024-05-09 NOTE — DISCHARGE NOTE PROVIDER - CARE PROVIDER_API CALL
Live Patten (DO)  Surgery  [address], [address]  [place] [address]  Phone: [phone]  Fax: [phone]  Follow Up Time:

## 2024-05-09 NOTE — DISCHARGE NOTE NURSING/CASE MANAGEMENT/SOCIAL WORK - NSDCPNINST_GEN_ALL_CORE
Use pain medications as directed. Drink at least 8 cups of water per day.  Monitor abdomen for signs and symptoms of infection, increased swelling. or drainage from the incision site.

## 2024-05-09 NOTE — BRIEF OPERATIVE NOTE - NSICDXBRIEFPROCEDURE_GEN_ALL_CORE_FT
PROCEDURES:  Reconstruction, abdominal wall, robot-assisted, using mesh 09-May-2024 16:51:02  Sonal Storm

## 2024-05-09 NOTE — DISCHARGE NOTE PROVIDER - NSDCCPCAREPLAN_GEN_ALL_CORE_FT
PRINCIPAL DISCHARGE DIAGNOSIS  Diagnosis: Abdominal wall hernia  Assessment and Plan of Treatment:

## 2024-05-09 NOTE — DISCHARGE NOTE PROVIDER - NSDCFUADDINST_GEN_ALL_CORE_FT
No heavy lifting for 6 weeks (nothing more than 10lbs). You may shower tomorrow. Let the soap and water run over the area. The skin glue over the incisions will dissolve on its own. Do not scrub. Pat dry.

## 2024-05-09 NOTE — PROGRESS NOTE ADULT - ASSESSMENT
Assessment: 48F with COPD and ventral hernia repaired in 2013, now with recurrence x1 year, presents with pain and incarcerated fat containing hernia.     Plan:    - npo  - mIVF  - OR lap vs robo hernia today  - lvx  - home inhalers  - pain control       [facility]   [id number]

## 2024-05-09 NOTE — DISCHARGE NOTE PROVIDER - NSDCMRMEDTOKEN_GEN_ALL_CORE_FT
albuterol 90 mcg/inh inhalation aerosol: 2 puff(s) inhaled 3 times a day, As Needed -for shortness of breath and/or wheezing   budesonide-formoterol 80 mcg-4.5 mcg/inh inhalation aerosol: 2 puff(s) inhaled 2 times a day   cyclobenzaprine 5 mg oral tablet: 1 tab(s) orally every 8 hours as needed for  muscle spasm  oxyCODONE 5 mg oral tablet: 1 tab(s) orally every 4 hours as needed for  severe pain MDD: 6  Ventolin HFA 90 mcg/inh inhalation aerosol: 2 puff(s) inhaled every 6 hours

## 2025-03-17 ENCOUNTER — EMERGENCY (EMERGENCY)
Facility: HOSPITAL | Age: 50
LOS: 0 days | Discharge: HOME | End: 2025-03-17
Attending: EMERGENCY MEDICINE
Payer: MEDICAID

## 2025-03-17 VITALS
SYSTOLIC BLOOD PRESSURE: 122 MMHG | TEMPERATURE: 98 F | OXYGEN SATURATION: 98 % | RESPIRATION RATE: 18 BRPM | HEART RATE: 74 BPM | DIASTOLIC BLOOD PRESSURE: 75 MMHG | WEIGHT: 202.83 LBS

## 2025-03-17 DIAGNOSIS — R09.81 NASAL CONGESTION: ICD-10-CM

## 2025-03-17 DIAGNOSIS — U07.1 COVID-19: ICD-10-CM

## 2025-03-17 DIAGNOSIS — R68.83 CHILLS (WITHOUT FEVER): ICD-10-CM

## 2025-03-17 DIAGNOSIS — R05.1 ACUTE COUGH: ICD-10-CM

## 2025-03-17 PROCEDURE — 99284 EMERGENCY DEPT VISIT MOD MDM: CPT

## 2025-03-17 PROCEDURE — 99283 EMERGENCY DEPT VISIT LOW MDM: CPT

## 2025-03-17 RX ORDER — NIRMATRELVIR AND RITONAVIR 150-100 MG
1 KIT ORAL
Qty: 10 | Refills: 0
Start: 2025-03-17 | End: 2025-03-21

## 2025-03-17 NOTE — ED ADULT NURSE NOTE - NSFALLUNIVINTERV_ED_ALL_ED
Bed/Stretcher in lowest position, wheels locked, appropriate side rails in place/Call bell, personal items and telephone in reach/Instruct patient to call for assistance before getting out of bed/chair/stretcher/Non-slip footwear applied when patient is off stretcher/[place] to call system/Physically safe environment - no spills, clutter or unnecessary equipment/Purposeful proactive rounding/Room/bathroom lighting operational, light cord in reach

## 2025-03-17 NOTE — ED PROVIDER NOTE - PATIENT PORTAL LINK FT
You can access the FollowMyHealth Patient Portal offered by [facility] by registering at the following website: [email or web address]. By joining [employer]’s FollowMyHealth portal, you will also be able to view your health information using other applications (apps) compatible with our system.

## 2025-03-17 NOTE — ED PROVIDER NOTE - OBJECTIVE STATEMENT
49 year old female past medical history of COPD/Asthma comes to emergency room for paxlovid. patient states that she was visiting [place] and started to feel sick yesterday and went to local hospital and was diagnosis with covid and had prescription sent in and cost from [phone] dollars because she had [place]. patient states that she is here now because she wants Rx sent into a [place] pharmacy. patient states she has cough and congestion. + chills.

## 2025-03-17 NOTE — ED PROVIDER NOTE - CLINICAL SUMMARY MEDICAL DECISION MAKING FREE TEXT BOX
49-year-old female history of COPD asthma diagnosed with COVID 2 days ago with the symptoms for 3 days.  Patient was diagnosed intolerance at Paxlovid from [place].  Insurance is not covered patient came to ED today well-appearing nontoxic for prescription for Paxlovid to her pharmacy in [place].  Patient well-appearing no hypoxia no distress  prescription sent  Patient to be discharged from ED in well appearing condition. Any available test results were discussed with and printed  for patient.  Verbal instructions given, including instructions to return to ED immediately for any new, worsening, or concerning symptoms. Limitations of ED work up discussed.  Patient reports understanding of above with capacity and insight. Written discharge instructions additionally given, including follow-up plan.

## 2025-03-17 NOTE — ED PROVIDER NOTE - PHYSICAL EXAMINATION
Physical Exam    Vital Signs: I have reviewed the initial vital signs.  Constitutional: well-nourished, appears stated age, no acute distress  Eyes: Conjunctiva pink, Sclera clear, PERRLA, EOMI.  Cardiovascular: S1 and S2, regular rate, regular rhythm, well-perfused extremities, radial pulses equal and 2+  Respiratory: unlabored respiratory effort, clear to auscultation bilaterally no wheezing, rales and rhonchi  Musculoskeletal: supple neck, no lower extremity edema, no midline tenderness  Integumentary: warm, dry, no rash  Neurologic: awake, alert  Psychiatric: appropriate mood, appropriate affect

## 2025-03-17 NOTE — ED ADULT TRIAGE NOTE - CHIEF COMPLAINT QUOTE
pt went to hospital in [place] and they diagnosed her with Covid. prescribed medication and they were charging her 1000.  needs the script for [place]

## 2025-04-25 ENCOUNTER — EMERGENCY (EMERGENCY)
Facility: HOSPITAL | Age: 50
LOS: 1 days | End: 2025-04-25
Attending: EMERGENCY MEDICINE | Admitting: EMERGENCY MEDICINE
Payer: MEDICAID

## 2025-04-25 VITALS
WEIGHT: 207.9 LBS | OXYGEN SATURATION: 98 % | TEMPERATURE: 98 F | HEIGHT: 62 IN | HEART RATE: 74 BPM | RESPIRATION RATE: 18 BRPM | DIASTOLIC BLOOD PRESSURE: 90 MMHG | SYSTOLIC BLOOD PRESSURE: 156 MMHG

## 2025-04-25 VITALS
HEART RATE: 70 BPM | SYSTOLIC BLOOD PRESSURE: 130 MMHG | RESPIRATION RATE: 16 BRPM | OXYGEN SATURATION: 100 % | DIASTOLIC BLOOD PRESSURE: 67 MMHG | TEMPERATURE: 98 F

## 2025-04-25 LAB
ALBUMIN SERPL ELPH-MCNC: 3.7 G/DL — SIGNIFICANT CHANGE UP (ref 3.3–5)
ALP SERPL-CCNC: 63 U/L — SIGNIFICANT CHANGE UP (ref 40–120)
ALT FLD-CCNC: 8 U/L — SIGNIFICANT CHANGE UP (ref 4–33)
ANION GAP SERPL CALC-SCNC: 13 MMOL/L — SIGNIFICANT CHANGE UP (ref 7–14)
AST SERPL-CCNC: 15 U/L — SIGNIFICANT CHANGE UP (ref 4–32)
BASOPHILS # BLD AUTO: 0.05 K/UL — SIGNIFICANT CHANGE UP (ref 0–0.2)
BASOPHILS NFR BLD AUTO: 0.5 % — SIGNIFICANT CHANGE UP (ref 0–2)
BILIRUB SERPL-MCNC: <0.2 MG/DL — SIGNIFICANT CHANGE UP (ref 0.2–1.2)
BLOOD GAS VENOUS COMPREHENSIVE RESULT: SIGNIFICANT CHANGE UP
BUN SERPL-MCNC: 9 MG/DL — SIGNIFICANT CHANGE UP (ref 7–23)
CALCIUM SERPL-MCNC: 8.6 MG/DL — SIGNIFICANT CHANGE UP (ref 8.4–10.5)
CHLORIDE SERPL-SCNC: 102 MMOL/L — SIGNIFICANT CHANGE UP (ref 98–107)
CK MB BLD-MCNC: 1.6 % — SIGNIFICANT CHANGE UP (ref 0–2.5)
CK MB CFR SERPL CALC: 1.3 NG/ML — SIGNIFICANT CHANGE UP
CK SERPL-CCNC: 82 U/L — SIGNIFICANT CHANGE UP (ref 25–170)
CO2 SERPL-SCNC: 21 MMOL/L — LOW (ref 22–31)
CREAT SERPL-MCNC: 0.53 MG/DL — SIGNIFICANT CHANGE UP (ref 0.5–1.3)
D DIMER BLD IA.RAPID-MCNC: <150 NG/ML DDU — SIGNIFICANT CHANGE UP
EGFR: 113 ML/MIN/1.73M2 — SIGNIFICANT CHANGE UP
EGFR: 113 ML/MIN/1.73M2 — SIGNIFICANT CHANGE UP
EOSINOPHIL # BLD AUTO: 0.37 K/UL — SIGNIFICANT CHANGE UP (ref 0–0.5)
EOSINOPHIL NFR BLD AUTO: 3.7 % — SIGNIFICANT CHANGE UP (ref 0–6)
FLUAV AG NPH QL: SIGNIFICANT CHANGE UP
FLUBV AG NPH QL: SIGNIFICANT CHANGE UP
GLUCOSE SERPL-MCNC: 105 MG/DL — HIGH (ref 70–99)
HCG SERPL-ACNC: <1 MIU/ML — SIGNIFICANT CHANGE UP
HCT VFR BLD CALC: 32.4 % — LOW (ref 34.5–45)
HGB BLD-MCNC: 10.2 G/DL — LOW (ref 11.5–15.5)
IANC: 5.33 K/UL — SIGNIFICANT CHANGE UP (ref 1.8–7.4)
IMM GRANULOCYTES NFR BLD AUTO: 0.3 % — SIGNIFICANT CHANGE UP (ref 0–0.9)
LYMPHOCYTES # BLD AUTO: 3.42 K/UL — HIGH (ref 1–3.3)
LYMPHOCYTES # BLD AUTO: 33.8 % — SIGNIFICANT CHANGE UP (ref 13–44)
MCHC RBC-ENTMCNC: 26.8 PG — LOW (ref 27–34)
MCHC RBC-ENTMCNC: 31.5 G/DL — LOW (ref 32–36)
MCV RBC AUTO: 85 FL — SIGNIFICANT CHANGE UP (ref 80–100)
MONOCYTES # BLD AUTO: 0.93 K/UL — HIGH (ref 0–0.9)
MONOCYTES NFR BLD AUTO: 9.2 % — SIGNIFICANT CHANGE UP (ref 2–14)
NEUTROPHILS # BLD AUTO: 5.33 K/UL — SIGNIFICANT CHANGE UP (ref 1.8–7.4)
NEUTROPHILS NFR BLD AUTO: 52.5 % — SIGNIFICANT CHANGE UP (ref 43–77)
NRBC # BLD AUTO: 0 K/UL — SIGNIFICANT CHANGE UP (ref 0–0)
NRBC # FLD: 0 K/UL — SIGNIFICANT CHANGE UP (ref 0–0)
NRBC BLD AUTO-RTO: 0 /100 WBCS — SIGNIFICANT CHANGE UP (ref 0–0)
NT-PROBNP SERPL-SCNC: 81 PG/ML — SIGNIFICANT CHANGE UP
PLATELET # BLD AUTO: 321 K/UL — SIGNIFICANT CHANGE UP (ref 150–400)
POTASSIUM SERPL-MCNC: 3.3 MMOL/L — LOW (ref 3.5–5.3)
POTASSIUM SERPL-SCNC: 3.3 MMOL/L — LOW (ref 3.5–5.3)
PROT SERPL-MCNC: 6.5 G/DL — SIGNIFICANT CHANGE UP (ref 6–8.3)
RBC # BLD: 3.81 M/UL — SIGNIFICANT CHANGE UP (ref 3.8–5.2)
RBC # FLD: 15.4 % — HIGH (ref 10.3–14.5)
RSV RNA NPH QL NAA+NON-PROBE: SIGNIFICANT CHANGE UP
SARS-COV-2 RNA SPEC QL NAA+PROBE: SIGNIFICANT CHANGE UP
SODIUM SERPL-SCNC: 136 MMOL/L — SIGNIFICANT CHANGE UP (ref 135–145)
SOURCE RESPIRATORY: SIGNIFICANT CHANGE UP
TROPONIN T, HIGH SENSITIVITY RESULT: 6 NG/L — SIGNIFICANT CHANGE UP
WBC # BLD: 10.13 K/UL — SIGNIFICANT CHANGE UP (ref 3.8–10.5)
WBC # FLD AUTO: 10.13 K/UL — SIGNIFICANT CHANGE UP (ref 3.8–10.5)

## 2025-04-25 PROCEDURE — 71046 X-RAY EXAM CHEST 2 VIEWS: CPT | Mod: 26

## 2025-04-25 PROCEDURE — 93010 ELECTROCARDIOGRAM REPORT: CPT

## 2025-04-25 PROCEDURE — 99285 EMERGENCY DEPT VISIT HI MDM: CPT | Mod: 25

## 2025-04-25 RX ORDER — AZITHROMYCIN 250 MG
1 CAPSULE ORAL
Qty: 7 | Refills: 0
Start: 2025-04-25 | End: 2025-05-01

## 2025-04-25 RX ORDER — DEXAMETHASONE 0.5 MG/1
6 TABLET ORAL ONCE
Refills: 0 | Status: COMPLETED | OUTPATIENT
Start: 2025-04-25 | End: 2025-04-25

## 2025-04-25 RX ORDER — IPRATROPIUM BROMIDE AND ALBUTEROL SULFATE .5; 2.5 MG/3ML; MG/3ML
3 SOLUTION RESPIRATORY (INHALATION)
Refills: 0 | Status: COMPLETED | OUTPATIENT
Start: 2025-04-25 | End: 2025-04-25

## 2025-04-25 RX ORDER — AZITHROMYCIN 250 MG
500 CAPSULE ORAL ONCE
Refills: 0 | Status: COMPLETED | OUTPATIENT
Start: 2025-04-25 | End: 2025-04-25

## 2025-04-25 RX ORDER — ALBUTEROL SULFATE 2.5 MG/3ML
2 VIAL, NEBULIZER (ML) INHALATION
Qty: 1 | Refills: 0
Start: 2025-04-25 | End: 2025-05-01

## 2025-04-25 RX ADMIN — IPRATROPIUM BROMIDE AND ALBUTEROL SULFATE 3 MILLILITER(S): .5; 2.5 SOLUTION RESPIRATORY (INHALATION) at 06:04

## 2025-04-25 RX ADMIN — DEXAMETHASONE 6 MILLIGRAM(S): 0.5 TABLET ORAL at 08:02

## 2025-04-25 RX ADMIN — Medication 250 MILLIGRAM(S): at 08:07

## 2025-04-25 RX ADMIN — Medication 500 MILLIGRAM(S): at 09:21

## 2025-04-25 RX ADMIN — IPRATROPIUM BROMIDE AND ALBUTEROL SULFATE 3 MILLILITER(S): .5; 2.5 SOLUTION RESPIRATORY (INHALATION) at 06:43

## 2025-04-25 RX ADMIN — IPRATROPIUM BROMIDE AND ALBUTEROL SULFATE 3 MILLILITER(S): .5; 2.5 SOLUTION RESPIRATORY (INHALATION) at 06:32

## 2025-04-25 NOTE — ED ADULT NURSE REASSESSMENT NOTE - NS ED NURSE REASSESS COMMENT FT1
Report received from Do sarabia RN. Pt laying on stretcher with eyes closed, responded to verbal stimuli. Breathing is easy, even and unlabored. Denies any difficulty breathing, chest pain, dizziness, nausea, palpitations. Azithromycin IVPB infusing via #20 gauge angio cath to right antecubital patent, no redness, swelling, or pain noted.

## 2025-04-25 NOTE — ED PROVIDER NOTE - MDM ORDERS SUBMITTED SELECTION
[FreeTextEntry1] : Pap done\par Self breast exam stressed\par Prescribed baseline bilateral screening mammogram\par Prescribed pelvic ultrasound \par Prescribed hormone profile\par Keep menstrual calendar\par Issues regarding irregular menses discussed with patient including possible etiologies, diagnostic and treatment options\par Follow-up yearly or as needed\par 
Imaging Studies/Medications

## 2025-04-25 NOTE — ED ADULT NURSE NOTE - NSFALLUNIVINTERV_ED_ALL_ED
Bed/Stretcher in lowest position, wheels locked, appropriate side rails in place/Call bell, personal items and telephone in reach/Instruct patient to call for assistance before getting out of bed/chair/stretcher/Non-slip footwear applied when patient is off stretcher/[place] to call system/Physically safe environment - no spills, clutter or unnecessary equipment/Purposeful proactive rounding/Room/bathroom lighting operational, light cord in reach

## 2025-04-25 NOTE — ED PROVIDER NOTE - ATTENDING CONTRIBUTION TO CARE
Patient evaluated prior to discharge  Provider CARLOS "49-year-old female, history of asthma/COPD exacerbation, former smoker, who is presenting to the ED today for shortness of breath and chest pain. Reports that she has been experiencing the symptoms for the last 4 days.  She was recently in [place] where she was seen in the emergency department, was told that her Troponins were elevated, and was advised to stay in the hospital, however states that she needed to be with her son, so she decided to leave [place]. Currently also reporting shortness of breath and chest pain. Also reporting increased cough and congestion in the last few days. Otherwise, no fever, chills, back pain, neurological symptoms, abdominal pain, urinary issues, lightheadedness, or dizziness. "  INDEPENDENT FINDINGS  Umbilical hernia repaiir/ Anemia  x2 dilatation and curettage  for missed     Vital Signs Last 24 Hrs  PE: as described; my additions and exceptions are noted in the chart    DATA:  EKG: pending at time of evaluation  LAB:                         10.2   10.13 )-----------( 321      ( 2025 05:35 )             32.4   04-25    136  |  102  |  9   ----------------------------<  105[H]  3.3[L]   |  21[L]  |  0.53    Ca    8.6      2025 05:35    TPro  6.5  /  Alb  3.7  /  TBili  <0.2  /  DBili  x   /  AST  15  /  ALT  8   /  AlkPhos  63  04-25   CARDIAC MARKERS ( 2025 05:35 )  x     / x     / x     / x     / 1.3 ng/mL    Urinalysis Basic - ( 2025 05:35 )    CXR IMPRESSION: No focal consolidations     IMPRESSION/RISK:  Dx= COPD exaceration  Consideration include Will treat as such with nebs steroids azithro and referral to pcp/pulm as new to area ([place].)  Plan  as above  RTED PRN

## 2025-04-25 NOTE — ED ADULT TRIAGE NOTE - CHIEF COMPLAINT QUOTE
c/o shortness pf breath since yesterday morning. patient was seen at hospital in [place] yesterday, pt states her troponin were elevated and was going to be admitted but she AMA.   Pt denies CP, N/V/D. Fever and Chills. Hx COPD/Asthma. Patient well appearing upon assessment. Awaiting EKG.

## 2025-04-25 NOTE — ED PROVIDER NOTE - OBJECTIVE STATEMENT
Saint Xiang, DO (PGY2): 49-year-old female, history of asthma/COPD exacerbation, former smoker, who is presenting to the ED today for shortness of breath and chest pain. Reports that she has been experiencing the symptoms for the last 4 days.  She was recently in [place] where she was seen in the emergency department, was told that her Troponins were elevated, and was advised to stay in the hospital, however states that she needed to be with her son, so she decided to leave [place]. Currently also reporting shortness of breath and chest pain. Also reporting increased cough and congestion in the last few days. Otherwise, no fever, chills, back pain, neurological symptoms, abdominal pain, urinary issues, lightheadedness, or dizziness.

## 2025-04-25 NOTE — ED PROVIDER NOTE - CHIEF COMPLAINT
<<-----Click on this checkbox to enter Pre-Op Dx The patient is a 49y Female complaining of shortness of breath.

## 2025-04-25 NOTE — ED PROVIDER NOTE - NSFOLLOWUPINSTRUCTIONS_ED_ALL_ED_FT
You are seen in the ED for shortness of breath.  Your evaluated with blood work and x-ray, the results are attached below.  You are given medication for symptoms with complete resolution.  No further acute intervention required in the ED.  Please return to ED for worsening symptoms.  A prescription of antibiotics was sent to your pharmacy, please take as prescribed.    Chronic Obstructive Pulmonary Disease Exacerbation  Outline of an adult body, showing normal lungs and lungs with COPD.  Chronic obstructive pulmonary disease (COPD) is a long-term (chronic) lung problem.    When you have COPD, it can feel harder to breathe in or out.    COPD exacerbation is a flare-up of symptoms when breathing gets worse and more treatment may be needed. Without treatment, flare-ups can be life-threatening. If they happen often, your lungs can become more damaged.    What are the causes?  Not taking your usual COPD medicines as told by your health care provider.  A cold or the flu, which can cause infection in your lungs.  Being exposed to things that make your breathing worse, such as:  Smoke.  Air pollution.  Fumes.  Dust.  Allergies.  Weather changes.  What are the signs or symptoms?  Symptoms do not get better or get worse even if you take your medicines as told by your provider. Symptoms may include:  More shortness of breath. You may only be able to speak one or two words at a time.  More coughing or mucus from your lungs.  More wheezing or chest tightness.  Being more tired and having less energy.  Confusion.  How is this diagnosed?  This condition is diagnosed based on:  Symptoms that get worse.  Your medical history.  A physical exam.  You may also have tests, including:  A chest X-ray.  Blood or mucus tests.  How is this treated?  You may be able to stay home or you may need to go to the hospital. Treatment may include:  Taking medicines. These may include:  Inhalers. These have medicines in them that you breathe in. These may be more of what you already take or they may be new.  Steroids. These reduce inflammation in the airways. These may be inhaled, taken by mouth, or given in an IV.  Antibiotics. These treat infection.  Using oxygen.  Using a device to help you clear mucus.  Follow these instructions at home:  Medicines    Take your medicines only as told by your provider.  If you were given antibiotics or steroids, take them as told by your provider. Do not stop taking them even if you start to feel better.  Lifestyle    Several times a day, wash your hands with soap and water for at least 20 seconds.  If you cannot use soap and water, use hand .  This may help keep you from getting an infection.  Avoid being around crowds or people who are sick.  Do not smoke or use any products that contain nicotine or tobacco. If you need help quitting, ask your provider.  Return to your normal activities when your provider says that it's safe.  Use breathing methods to control your stress and catch your breath.  How is this prevented?  Follow your COPD action plan. The action plan tells you what to do if you're feeling good and what to do when you start feeling worse. Discuss the plan often with your provider.  Make sure you get all the shots, also called vaccines, that your provider recommends. Ask your provider about a flu shot and a pneumonia shot.  Use oxygen therapy if told by your provider. If you need home oxygen therapy, ask your provider how often to check your oxygen level with a device called an oximeter.  Keep all follow-up visits to review your COPD action plan. Your provider will want to check on your condition often to keep you healthy and out of the hospital.  Contact a health care provider if:  Your COPD symptoms get worse.  You have a fever or chills.  You have trouble doing daily activities.  You have trouble breathing even when you are resting.  Get help right away if:  You are short of breath and cannot:  Talk in full sentences.  Do normal activities.  You have chest pain.  You feel confused.  These symptoms may be an emergency. Call 911 right away.  Do not wait to see if the symptoms will go away.  Do not drive yourself to the hospital.  This information is not intended to replace advice given to you by your health care provider. Make sure you discuss any questions you have with your health care provider.

## 2025-04-25 NOTE — ED ADULT NURSE NOTE - PATIENT'S SEXUAL ORIENTATION
A  personal message from Dr. Nancy Fisher,  Thank you so much for allowing me to care for you today. I pride myself in the care and attention I give all my patients. I hope you were a witness to this tonight. If for any reason your condition does not improve or worsens, or you have a question that was not answered during your visit you can feel free to text me on my personal phone #  # [phone]. I will answer to your message and continue your care past your emergency room visit. Please understand that although you are being discharged because your condition has been deemed stable and able to be managed on an outpatient setting. However your condition may worsen as part of the natural progression of the illness/condition, if this occurs please come back to the emergency department for a repeat evaluation. Unknown

## 2025-04-25 NOTE — ED PROVIDER NOTE - PATIENT PORTAL LINK FT
You can access the FollowMyHealth Patient Portal offered by [facility] by registering at the following website: [email or web address]. By joining [email or web address]’s FollowMyHealth portal, you will also be able to view your health information using other applications (apps) compatible with our system.

## 2025-04-25 NOTE — ED ADULT NURSE NOTE - OBJECTIVE STATEMENT
Pt received in spot 8A, aaox3, ambulatory, breathing even and unlabored in bed. Pt came from hospital in [place] after she AMA. Pt was seen there for chest pain and SOB. Pt states she AMA and came here due to this hospital being near her house. Was told at other hospital she had elevated troponin. Pt currently endorsing SOB x few hours. Hx COPD not on oxygen. Pt denies chest pain, dizziness, headache, blurry vision, chills. Bed in lowest position, call bell within reach. #20G IV placed in the right AC, labs drawn and sent.

## 2025-04-25 NOTE — ED PROVIDER NOTE - CLINICAL SUMMARY MEDICAL DECISION MAKING FREE TEXT BOX
Saint Xiang, DO (PGY2): well-appearing 49-year-old female, history of asthma/COPD exacerbation, former smoker, who is presenting to the ED today for shortness of breath and chest pain. Vitals stable. EKG NSR, normal intervals, nonischemic. Physical exam as above. Differential diagnosis includes but is not limited to asthma/COPD exacerbation, viral illness, ACS. low concerns for PE. Plan for labs including CBC, CMP, trop, VBG, d-dimer. Will obtain chest xray. Will give duoneb and reassess. Dispo and further management pending results and reassessment.

## 2025-05-08 ENCOUNTER — EMERGENCY (EMERGENCY)
Facility: HOSPITAL | Age: 50
LOS: 1 days | End: 2025-05-08
Attending: STUDENT IN AN ORGANIZED HEALTH CARE EDUCATION/TRAINING PROGRAM | Admitting: STUDENT IN AN ORGANIZED HEALTH CARE EDUCATION/TRAINING PROGRAM
Payer: MEDICAID

## 2025-05-08 VITALS
HEIGHT: 62 IN | RESPIRATION RATE: 20 BRPM | OXYGEN SATURATION: 95 % | HEART RATE: 72 BPM | WEIGHT: 190.04 LBS | SYSTOLIC BLOOD PRESSURE: 153 MMHG | TEMPERATURE: 98 F | DIASTOLIC BLOOD PRESSURE: 80 MMHG

## 2025-05-08 VITALS
HEART RATE: 64 BPM | DIASTOLIC BLOOD PRESSURE: 66 MMHG | SYSTOLIC BLOOD PRESSURE: 108 MMHG | RESPIRATION RATE: 14 BRPM | OXYGEN SATURATION: 97 % | TEMPERATURE: 98 F

## 2025-05-08 LAB
ALBUMIN SERPL ELPH-MCNC: 4 G/DL — SIGNIFICANT CHANGE UP (ref 3.3–5)
ALP SERPL-CCNC: 64 U/L — SIGNIFICANT CHANGE UP (ref 40–120)
ALT FLD-CCNC: 9 U/L — SIGNIFICANT CHANGE UP (ref 4–33)
ANION GAP SERPL CALC-SCNC: 14 MMOL/L — SIGNIFICANT CHANGE UP (ref 7–14)
AST SERPL-CCNC: 15 U/L — SIGNIFICANT CHANGE UP (ref 4–32)
BASOPHILS # BLD AUTO: 0.04 K/UL — SIGNIFICANT CHANGE UP (ref 0–0.2)
BASOPHILS NFR BLD AUTO: 0.5 % — SIGNIFICANT CHANGE UP (ref 0–2)
BILIRUB SERPL-MCNC: <0.2 MG/DL — SIGNIFICANT CHANGE UP (ref 0.2–1.2)
BUN SERPL-MCNC: 9 MG/DL — SIGNIFICANT CHANGE UP (ref 7–23)
CALCIUM SERPL-MCNC: 8.9 MG/DL — SIGNIFICANT CHANGE UP (ref 8.4–10.5)
CHLORIDE SERPL-SCNC: 105 MMOL/L — SIGNIFICANT CHANGE UP (ref 98–107)
CO2 SERPL-SCNC: 20 MMOL/L — LOW (ref 22–31)
CREAT SERPL-MCNC: 0.58 MG/DL — SIGNIFICANT CHANGE UP (ref 0.5–1.3)
EGFR: 111 ML/MIN/1.73M2 — SIGNIFICANT CHANGE UP
EGFR: 111 ML/MIN/1.73M2 — SIGNIFICANT CHANGE UP
EOSINOPHIL # BLD AUTO: 0.54 K/UL — HIGH (ref 0–0.5)
EOSINOPHIL NFR BLD AUTO: 7.4 % — HIGH (ref 0–6)
FLUAV AG NPH QL: SIGNIFICANT CHANGE UP
FLUBV AG NPH QL: SIGNIFICANT CHANGE UP
GLUCOSE SERPL-MCNC: 100 MG/DL — HIGH (ref 70–99)
HCT VFR BLD CALC: 35.5 % — SIGNIFICANT CHANGE UP (ref 34.5–45)
HGB BLD-MCNC: 10.9 G/DL — LOW (ref 11.5–15.5)
IANC: 3.32 K/UL — SIGNIFICANT CHANGE UP (ref 1.8–7.4)
IMM GRANULOCYTES NFR BLD AUTO: 0.1 % — SIGNIFICANT CHANGE UP (ref 0–0.9)
LYMPHOCYTES # BLD AUTO: 2.74 K/UL — SIGNIFICANT CHANGE UP (ref 1–3.3)
LYMPHOCYTES # BLD AUTO: 37.5 % — SIGNIFICANT CHANGE UP (ref 13–44)
MCHC RBC-ENTMCNC: 26.6 PG — LOW (ref 27–34)
MCHC RBC-ENTMCNC: 30.7 G/DL — LOW (ref 32–36)
MCV RBC AUTO: 86.6 FL — SIGNIFICANT CHANGE UP (ref 80–100)
MONOCYTES # BLD AUTO: 0.65 K/UL — SIGNIFICANT CHANGE UP (ref 0–0.9)
MONOCYTES NFR BLD AUTO: 8.9 % — SIGNIFICANT CHANGE UP (ref 2–14)
NEUTROPHILS # BLD AUTO: 3.32 K/UL — SIGNIFICANT CHANGE UP (ref 1.8–7.4)
NEUTROPHILS NFR BLD AUTO: 45.6 % — SIGNIFICANT CHANGE UP (ref 43–77)
NRBC # BLD AUTO: 0 K/UL — SIGNIFICANT CHANGE UP (ref 0–0)
NRBC # FLD: 0 K/UL — SIGNIFICANT CHANGE UP (ref 0–0)
NRBC BLD AUTO-RTO: 0 /100 WBCS — SIGNIFICANT CHANGE UP (ref 0–0)
NT-PROBNP SERPL-SCNC: 46 PG/ML — SIGNIFICANT CHANGE UP
PLATELET # BLD AUTO: 299 K/UL — SIGNIFICANT CHANGE UP (ref 150–400)
POTASSIUM SERPL-MCNC: 3.6 MMOL/L — SIGNIFICANT CHANGE UP (ref 3.5–5.3)
POTASSIUM SERPL-SCNC: 3.6 MMOL/L — SIGNIFICANT CHANGE UP (ref 3.5–5.3)
PROT SERPL-MCNC: 6.9 G/DL — SIGNIFICANT CHANGE UP (ref 6–8.3)
RBC # BLD: 4.1 M/UL — SIGNIFICANT CHANGE UP (ref 3.8–5.2)
RBC # FLD: 16.1 % — HIGH (ref 10.3–14.5)
RSV RNA NPH QL NAA+NON-PROBE: SIGNIFICANT CHANGE UP
SARS-COV-2 RNA SPEC QL NAA+PROBE: SIGNIFICANT CHANGE UP
SODIUM SERPL-SCNC: 139 MMOL/L — SIGNIFICANT CHANGE UP (ref 135–145)
SOURCE RESPIRATORY: SIGNIFICANT CHANGE UP
TROPONIN T, HIGH SENSITIVITY RESULT: 6 NG/L — SIGNIFICANT CHANGE UP
WBC # BLD: 7.3 K/UL — SIGNIFICANT CHANGE UP (ref 3.8–10.5)
WBC # FLD AUTO: 7.3 K/UL — SIGNIFICANT CHANGE UP (ref 3.8–10.5)

## 2025-05-08 PROCEDURE — 71046 X-RAY EXAM CHEST 2 VIEWS: CPT | Mod: 26

## 2025-05-08 PROCEDURE — 99285 EMERGENCY DEPT VISIT HI MDM: CPT | Mod: 25

## 2025-05-08 PROCEDURE — 93010 ELECTROCARDIOGRAM REPORT: CPT

## 2025-05-08 RX ORDER — IPRATROPIUM BROMIDE AND ALBUTEROL SULFATE .5; 2.5 MG/3ML; MG/3ML
3 SOLUTION RESPIRATORY (INHALATION)
Refills: 0 | Status: COMPLETED | OUTPATIENT
Start: 2025-05-08 | End: 2025-05-08

## 2025-05-08 RX ORDER — METHYLPREDNISOLONE ACETATE 80 MG/ML
60 INJECTION, SUSPENSION INTRA-ARTICULAR; INTRALESIONAL; INTRAMUSCULAR; SOFT TISSUE ONCE
Refills: 0 | Status: COMPLETED | OUTPATIENT
Start: 2025-05-08 | End: 2025-05-08

## 2025-05-08 RX ORDER — PREDNISONE 20 MG/1
1 TABLET ORAL
Qty: 4 | Refills: 0
Start: 2025-05-08 | End: 2025-05-11

## 2025-05-08 RX ORDER — IPRATROPIUM BROMIDE AND ALBUTEROL SULFATE .5; 2.5 MG/3ML; MG/3ML
3 SOLUTION RESPIRATORY (INHALATION) ONCE
Refills: 0 | Status: COMPLETED | OUTPATIENT
Start: 2025-05-08 | End: 2025-05-08

## 2025-05-08 RX ORDER — ALBUTEROL SULFATE 2.5 MG/3ML
2 VIAL, NEBULIZER (ML) INHALATION
Qty: 1 | Refills: 0
Start: 2025-05-08 | End: 2025-05-14

## 2025-05-08 RX ORDER — ACETAMINOPHEN 500 MG/5ML
975 LIQUID (ML) ORAL ONCE
Refills: 0 | Status: COMPLETED | OUTPATIENT
Start: 2025-05-08 | End: 2025-05-08

## 2025-05-08 RX ADMIN — IPRATROPIUM BROMIDE AND ALBUTEROL SULFATE 3 MILLILITER(S): .5; 2.5 SOLUTION RESPIRATORY (INHALATION) at 06:46

## 2025-05-08 RX ADMIN — IPRATROPIUM BROMIDE AND ALBUTEROL SULFATE 3 MILLILITER(S): .5; 2.5 SOLUTION RESPIRATORY (INHALATION) at 09:01

## 2025-05-08 RX ADMIN — METHYLPREDNISOLONE ACETATE 60 MILLIGRAM(S): 80 INJECTION, SUSPENSION INTRA-ARTICULAR; INTRALESIONAL; INTRAMUSCULAR; SOFT TISSUE at 06:44

## 2025-05-08 RX ADMIN — Medication 975 MILLIGRAM(S): at 07:30

## 2025-05-08 RX ADMIN — IPRATROPIUM BROMIDE AND ALBUTEROL SULFATE 3 MILLILITER(S): .5; 2.5 SOLUTION RESPIRATORY (INHALATION) at 08:43

## (undated) DEVICE — WARMING BLANKET LOWER ADULT

## (undated) DEVICE — XI DRAPE COLUMN

## (undated) DEVICE — MARKING PEN W RULER

## (undated) DEVICE — DRAIN PENROSE .25" X 18" LATEX

## (undated) DEVICE — TUBING SUCTION 20FT

## (undated) DEVICE — GLV 7.5 PROTEXIS (WHITE)

## (undated) DEVICE — XI DRAPE ARM

## (undated) DEVICE — MEDICATION LABELS W MARKER

## (undated) DEVICE — XI ARM FORCEP FENESTRATED BIPOLAR 8MM

## (undated) DEVICE — PACK ROBOTIC LIJ

## (undated) DEVICE — SOL IRR POUR H2O 250ML

## (undated) DEVICE — SOL IRR POUR NS 0.9% 500ML

## (undated) DEVICE — TUBING AIRSEAL TRI-LUMEN FILTERED

## (undated) DEVICE — PREP CHLORAPREP HI-LITE ORANGE 26ML

## (undated) DEVICE — LAP PAD 18 X 18"

## (undated) DEVICE — TROCAR SURGIQUEST AIRSEAL 5MM X 100MM

## (undated) DEVICE — TUBING STRYKEFLOW II SUCTION / IRRIGATOR

## (undated) DEVICE — GLV 6.5 PROTEXIS (WHITE)

## (undated) DEVICE — FOLEY HOLDER STATLOCK 2 WAY ADULT

## (undated) DEVICE — XI ARM NEEDLE DRIVER SUTURECUT MEGA 8MM

## (undated) DEVICE — XI SEAL UNIVERSIAL 5-12MM

## (undated) DEVICE — SUT POLYSORB 0 30" GU-45

## (undated) DEVICE — ELCTR BOVIE PENCIL SMOKE EVACUATION

## (undated) DEVICE — D HELP - CLEARVIEW CLEARIFY SYSTEM

## (undated) DEVICE — SUT VLOC 180 0 9" GS-21 GREEN

## (undated) DEVICE — TUBING INSUFFLATION LAP FILTER 10FT

## (undated) DEVICE — FOLEY TRAY 16FR 5CC LTX UMETER CLOSED

## (undated) DEVICE — DRSG TEGADERM 6"X8"

## (undated) DEVICE — TROCAR COVIDIEN ENDO CLOSE SUTURING DEVICE

## (undated) DEVICE — XI OBTURATOR OPTICAL BLADELESS 8MM

## (undated) DEVICE — POSITIONER FOAM EGG CRATE ULNAR 2PCS (PINK)

## (undated) DEVICE — DRAPE MAYO STAND 30"

## (undated) DEVICE — XI ARM SCISSOR MONO CURVED

## (undated) DEVICE — SPECIMEN CONTAINER 100ML

## (undated) DEVICE — WARMING BLANKET UPPER ADULT

## (undated) DEVICE — DRAPE BACK TABLE COVER HEAVY DUTY 60"

## (undated) DEVICE — POSITIONER PURPLE ARM ONE STEP (LARGE)

## (undated) DEVICE — SUT POLYSORB 1 60" TIES

## (undated) DEVICE — SUT VLOC 180 3-0 9" V-20 GREEN

## (undated) DEVICE — DRSG OPSITE 13.75 X 4"

## (undated) DEVICE — XI VESSEL SEALER

## (undated) DEVICE — VENODYNE/SCD SLEEVE FOOT

## (undated) DEVICE — POSITIONER FOAM HEAD CRADLE (PINK)

## (undated) DEVICE — DRSG STERISTRIPS 0.5 X 4"

## (undated) DEVICE — ELCTR BOVIE TIP BLADE INSULATED 2.75" EDGE

## (undated) DEVICE — XI TIP COVER

## (undated) DEVICE — SUT MONOCRYL 4-0 27" PS-2 UNDYED

## (undated) DEVICE — TUBING OLYMPUS INSUFFLATION

## (undated) DEVICE — VISITEC 4X4

## (undated) DEVICE — SUT VLOC 180 2-0 6" GS-22 GREEN

## (undated) DEVICE — NDL HYPO REGULAR BEVEL 25G X 1.5" (BLUE)

## (undated) DEVICE — DRAPE INSTRUMENT POUCH 6.75" X 11"

## (undated) DEVICE — TIP SUCT REG 5MMX32CM

## (undated) DEVICE — DRAIN PENROSE .5" X 18" LATEX

## (undated) DEVICE — FOLEY TRAY 16FR 5CC LF UMETER CLOSED

## (undated) DEVICE — VENODYNE/SCD SLEEVE CALF MEDIUM

## (undated) DEVICE — GLV 7 PROTEXIS (WHITE)